# Patient Record
Sex: FEMALE | Race: WHITE | NOT HISPANIC OR LATINO | Employment: FULL TIME | URBAN - METROPOLITAN AREA
[De-identification: names, ages, dates, MRNs, and addresses within clinical notes are randomized per-mention and may not be internally consistent; named-entity substitution may affect disease eponyms.]

---

## 2017-07-10 ENCOUNTER — OFFICE VISIT (OUTPATIENT)
Dept: GASTROENTEROLOGY | Facility: CLINIC | Age: 52
End: 2017-07-10

## 2017-07-10 VITALS
WEIGHT: 124.2 LBS | TEMPERATURE: 98.3 F | SYSTOLIC BLOOD PRESSURE: 118 MMHG | DIASTOLIC BLOOD PRESSURE: 78 MMHG | HEIGHT: 64 IN | BODY MASS INDEX: 21.21 KG/M2

## 2017-07-10 DIAGNOSIS — K21.9 GASTROESOPHAGEAL REFLUX DISEASE WITHOUT ESOPHAGITIS: Primary | ICD-10-CM

## 2017-07-10 PROCEDURE — 99213 OFFICE O/P EST LOW 20 MIN: CPT | Performed by: NURSE PRACTITIONER

## 2017-07-10 RX ORDER — PANTOPRAZOLE SODIUM 40 MG/1
40 TABLET, DELAYED RELEASE ORAL DAILY
Qty: 90 TABLET | Refills: 4 | Status: SHIPPED | OUTPATIENT
Start: 2017-07-10 | End: 2018-08-10 | Stop reason: SDUPTHER

## 2017-07-10 NOTE — PROGRESS NOTES
"Chief Complaint   Patient presents with   • Heartburn         HPI    51-year-old female patient of Dr. Mike's followed in the office for history of GERD.  Last evaluated in June 2016.  At that time patient was complaining of breakthrough GERD symptoms that were occurring primarily at night.  She attempted to take her PPI twice daily but was unable to tolerate the medications as it \"made her feel bad\".  In addition, she did trial some over-the-counter medications which had similar side effects.  It was recommended that she take the PPI at night 30 minutes before her evening meal as her symptoms were worse in the evening as she was unable to tolerate twice a day dosing.  She presents today for routine follow-up    Patient states she is having much better symptom control with using the PPI at night.  Her GERD has been markedly improved.  Previously she was awaking anywhere from 5-7 times a night now it's anywhere from 2-3 times every couple of weeks.  She is having to utilize no over-the-counter medications for breakthrough symptoms.  She often finds that late night eating will precipitate one of the breakthrough events.  She has had no weight loss.  She denies any dysphagia or odynophagia.  Her prior EGD was performed in August 2010 with findings of gastritis with negative pathology.  Colonoscopy was performed at the same time with findings of only internal hemorrhoids.  She does report that she may have a family history of colon cancer affecting her grandmother but is unsure and plans to verify this with other family members.    Past Medical History:   Diagnosis Date   • GERD (gastroesophageal reflux disease)      Past Surgical History:   Procedure Laterality Date   • CHOLECYSTECTOMY     • COLONOSCOPY     • COLONOSCOPY W/ BIOPSIES  08/17/2010    ih, path benign   • HYSTERECTOMY     • UPPER GASTROINTESTINAL ENDOSCOPY  08/17/2010    acute gastritis, path mild chronic gastritis       Current Outpatient Prescriptions " "  Medication Sig Dispense Refill   • pantoprazole (PROTONIX) 40 MG EC tablet Take 1 tablet by mouth Daily. 90 tablet 4     No current facility-administered medications for this visit.        PRN Meds:.    Allergies   Allergen Reactions   • Penicillins    • Sulfa Antibiotics        Social History     Social History   • Marital status:      Spouse name: N/A   • Number of children: N/A   • Years of education: N/A     Occupational History   • Not on file.     Social History Main Topics   • Smoking status: Never Smoker   • Smokeless tobacco: Not on file   • Alcohol use No   • Drug use: Not on file   • Sexual activity: Not on file     Other Topics Concern   • Not on file     Social History Narrative       History reviewed. No pertinent family history.    Review of Systems   Constitutional: Negative for activity change, appetite change and unexpected weight change.   HENT: Negative for trouble swallowing.    Respiratory: Negative for choking and chest tightness.    Gastrointestinal: Negative for abdominal distention, abdominal pain, constipation, diarrhea, nausea and vomiting.   Allergic/Immunologic: Negative for immunocompromised state.       Vitals:    07/10/17 1003   BP: 118/78   Temp: 98.3 °F (36.8 °C)     /78  Temp 98.3 °F (36.8 °C)  Ht 64\" (162.6 cm)  Wt 124 lb 3.2 oz (56.3 kg)  BMI 21.32 kg/m2  Physical Exam   Constitutional: She is oriented to person, place, and time. She appears well-developed and well-nourished. No distress.   HENT:   Head: Normocephalic and atraumatic.   Eyes: No scleral icterus.   Cardiovascular: Normal rate and regular rhythm.    Pulmonary/Chest: Effort normal and breath sounds normal.   Abdominal: Soft. Bowel sounds are normal. She exhibits no distension. There is no tenderness.   Neurological: She is alert and oriented to person, place, and time.   Skin: Skin is warm and dry.   Psychiatric: She has a normal mood and affect.   Vitals reviewed.      ASSESSMENT AND PLAN    Leandra " was seen today for heartburn.    Diagnoses and all orders for this visit:    Gastroesophageal reflux disease without esophagitis  Comments:  EGD 8/2010 with gastritis, negative path.  Continue PPI    Other orders  -     pantoprazole (PROTONIX) 40 MG EC tablet; Take 1 tablet by mouth Daily.    Continue current PPI and refill was provided for one year.  Discussed avoidance of late night eating, common food triggers with regard to GERD symptoms and elevation of the head of the bed as preventative measures.  She will return to the office in one year, sooner for any concerns, questions or changes from today's assessment.        YAO Araujo   [facility]  [address] [place] [address]  Office: [phone]

## 2017-07-21 ENCOUNTER — DOCUMENTATION (OUTPATIENT)
Dept: GASTROENTEROLOGY | Facility: CLINIC | Age: 52
End: 2017-07-21

## 2017-07-31 ENCOUNTER — DOCUMENTATION (OUTPATIENT)
Dept: GASTROENTEROLOGY | Facility: CLINIC | Age: 52
End: 2017-07-31

## 2017-07-31 NOTE — PROGRESS NOTES
Spoke with Rob at [employer] he states that if patient is taking one table by mouth daily no PA is required.  Only required if patient is taking two a day

## 2018-08-10 ENCOUNTER — OFFICE VISIT (OUTPATIENT)
Dept: GASTROENTEROLOGY | Facility: CLINIC | Age: 53
End: 2018-08-10

## 2018-08-10 VITALS
DIASTOLIC BLOOD PRESSURE: 76 MMHG | BODY MASS INDEX: 20.73 KG/M2 | TEMPERATURE: 98.5 F | WEIGHT: 121.4 LBS | SYSTOLIC BLOOD PRESSURE: 116 MMHG | HEIGHT: 64 IN

## 2018-08-10 DIAGNOSIS — R10.10 PAIN OF UPPER ABDOMEN: ICD-10-CM

## 2018-08-10 DIAGNOSIS — K21.9 GASTROESOPHAGEAL REFLUX DISEASE WITHOUT ESOPHAGITIS: Primary | ICD-10-CM

## 2018-08-10 DIAGNOSIS — K29.30 CHRONIC SUPERFICIAL GASTRITIS WITHOUT BLEEDING: ICD-10-CM

## 2018-08-10 LAB
25(OH)D3+25(OH)D2 SERPL-MCNC: 41.2 NG/ML (ref 30–100)
ALBUMIN SERPL-MCNC: 4.5 G/DL (ref 3.5–5.2)
ALBUMIN/GLOB SERPL: 1.7 G/DL
ALP SERPL-CCNC: 96 U/L (ref 39–117)
ALT SERPL-CCNC: 20 U/L (ref 1–33)
AST SERPL-CCNC: 21 U/L (ref 1–32)
BASOPHILS # BLD MANUAL: 0 10*3/MM3 (ref 0–0.2)
BASOPHILS NFR BLD MANUAL: 0 % (ref 0–1.5)
BILIRUB SERPL-MCNC: 0.2 MG/DL (ref 0.1–1.2)
BUN SERPL-MCNC: 19 MG/DL (ref 6–20)
BUN/CREAT SERPL: 22.9 (ref 7–25)
CALCIUM SERPL-MCNC: 9.6 MG/DL (ref 8.6–10.5)
CHLORIDE SERPL-SCNC: 104 MMOL/L (ref 98–107)
CO2 SERPL-SCNC: 25.9 MMOL/L (ref 22–29)
CREAT SERPL-MCNC: 0.83 MG/DL (ref 0.57–1)
DIFFERENTIAL COMMENT: NORMAL
EOSINOPHIL # BLD MANUAL: 0.28 10*3/MM3 (ref 0–0.7)
EOSINOPHIL NFR BLD MANUAL: 4 % (ref 0.3–6.2)
ERYTHROCYTE [DISTWIDTH] IN BLOOD BY AUTOMATED COUNT: 13 % (ref 11.7–13)
GLOBULIN SER CALC-MCNC: 2.7 GM/DL
GLUCOSE SERPL-MCNC: 101 MG/DL (ref 65–99)
HCT VFR BLD AUTO: 42.3 % (ref 35.6–45.5)
HGB BLD-MCNC: 13.7 G/DL (ref 11.9–15.5)
LYMPHOCYTES # BLD MANUAL: 1.84 10*3/MM3 (ref 0.9–4.8)
LYMPHOCYTES NFR BLD MANUAL: 26 % (ref 19.6–45.3)
MCH RBC QN AUTO: 29.6 PG (ref 26.9–32)
MCHC RBC AUTO-ENTMCNC: 32.4 G/DL (ref 32.4–36.3)
MCV RBC AUTO: 91.4 FL (ref 80.5–98.2)
MONOCYTES # BLD MANUAL: 0.71 10*3/MM3 (ref 0.2–1.2)
MONOCYTES NFR BLD MANUAL: 10 % (ref 5–12)
NEUTROPHILS # BLD MANUAL: 4.24 10*3/MM3 (ref 1.9–8.1)
NEUTROPHILS NFR BLD MANUAL: 60 % (ref 42.7–76)
PLATELET # BLD AUTO: 240 10*3/MM3 (ref 140–500)
PLATELET BLD QL SMEAR: NORMAL
POTASSIUM SERPL-SCNC: 4.2 MMOL/L (ref 3.5–5.2)
PROT SERPL-MCNC: 7.2 G/DL (ref 6–8.5)
RBC # BLD AUTO: 4.63 10*6/MM3 (ref 3.9–5.2)
RBC MORPH BLD: NORMAL
SODIUM SERPL-SCNC: 143 MMOL/L (ref 136–145)
WBC # BLD AUTO: 7.07 10*3/MM3 (ref 4.5–10.7)

## 2018-08-10 PROCEDURE — 99214 OFFICE O/P EST MOD 30 MIN: CPT | Performed by: NURSE PRACTITIONER

## 2018-08-10 RX ORDER — CHOLECALCIFEROL (VITAMIN D3) 125 MCG
5 CAPSULE ORAL NIGHTLY PRN
COMMUNITY
End: 2020-12-08

## 2018-08-10 RX ORDER — PANTOPRAZOLE SODIUM 40 MG/1
40 TABLET, DELAYED RELEASE ORAL DAILY
Qty: 90 TABLET | Refills: 3 | Status: SHIPPED | OUTPATIENT
Start: 2018-08-10 | End: 2019-06-11 | Stop reason: ALTCHOICE

## 2018-08-10 NOTE — PROGRESS NOTES
Chief Complaint   Patient presents with   • Follow-up   • Heartburn       Leandra Corona is a  53 y.o. female here for a follow up visit for GERD.    HPI  52 yo f presents today for follow up visit for GERD. She is a patient of Dr. Mike. She was last seen in the office on 7/2017. She has hx GERD and admits she does well during the day on Protonix 40 mg daily but admits she has breakthrough reflux symptoms at night. She denies any dysphagia, abd pain, N&V, diarrhea, constipation, rectal bleeding or melena. She admits her appetite is good and her weight is stable.     Past Medical History:   Diagnosis Date   • GERD (gastroesophageal reflux disease)        Past Surgical History:   Procedure Laterality Date   • CHOLECYSTECTOMY     • COLONOSCOPY     • COLONOSCOPY W/ BIOPSIES  08/17/2010    ih, path benign   • HYSTERECTOMY     • UPPER GASTROINTESTINAL ENDOSCOPY  08/17/2010    acute gastritis, path mild chronic gastritis       Scheduled Meds:    Continuous Infusions:  No current facility-administered medications for this visit.     PRN Meds:.    Allergies   Allergen Reactions   • Penicillins    • Sulfa Antibiotics        Social History     Social History   • Marital status:      Spouse name: N/A   • Number of children: N/A   • Years of education: N/A     Occupational History   • Not on file.     Social History Main Topics   • Smoking status: Never Smoker   • Smokeless tobacco: Not on file   • Alcohol use No   • Drug use: Unknown   • Sexual activity: Not on file     Other Topics Concern   • Not on file     Social History Narrative   • No narrative on file       Family History   Problem Relation Age of Onset   • Family history unknown: Yes       Review of Systems   Constitutional: Negative for appetite change, chills, diaphoresis, fatigue, fever and unexpected weight change.   HENT: Negative for nosebleeds, postnasal drip, sore throat, trouble swallowing and voice change.    Respiratory: Negative for cough, choking,  chest tightness, shortness of breath and wheezing.    Cardiovascular: Negative for chest pain.   Gastrointestinal: Negative for abdominal distention, abdominal pain, anal bleeding, blood in stool, constipation, diarrhea, nausea, rectal pain and vomiting.   Endocrine: Negative for polydipsia, polyphagia and polyuria.   Musculoskeletal: Negative for gait problem.   Skin: Negative for rash and wound.   Allergic/Immunologic: Negative for food allergies.   Neurological: Negative for dizziness, speech difficulty and light-headedness.   Psychiatric/Behavioral: Negative for confusion, self-injury, sleep disturbance and suicidal ideas.       Vitals:    08/10/18 1509   BP: 116/76   Temp: 98.5 °F (36.9 °C)       Physical Exam   Constitutional: She is oriented to person, place, and time. She appears well-developed and well-nourished. She does not appear ill. No distress.   HENT:   Head: Normocephalic.   Eyes: Pupils are equal, round, and reactive to light.   Cardiovascular: Normal rate, regular rhythm and normal heart sounds.    Pulmonary/Chest: Effort normal and breath sounds normal.   Abdominal: Soft. Bowel sounds are normal. She exhibits no distension and no mass. There is no hepatosplenomegaly. There is no tenderness. There is no rebound and no guarding. No hernia.   Musculoskeletal: Normal range of motion.   Neurological: She is alert and oriented to person, place, and time.   Skin: Skin is warm and dry.   Psychiatric: She has a normal mood and affect. Her speech is normal and behavior is normal. Judgment normal.       No images are attached to the encounter.    Assessment & plan     1. Gastroesophageal reflux disease without esophagitis  - CBC & Differential  - Vitamin D 25 Hydroxy  - Comprehensive Metabolic Panel  - pantoprazole (PROTONIX) 40 MG EC tablet; Take 1 tablet by mouth Daily.  Dispense: 90 tablet; Refill: 3    2. Pain of upper abdomen  - CBC & Differential  - Vitamin D 25 Hydroxy  - Comprehensive Metabolic  Panel    3. Chronic superficial gastritis without bleeding      GERD is not well controlled at night. Will have her continue the protonix 40 mg daily but add a Zantac or pepcid onto her nightly routine. Discussed possible long term effects of PPIs. Will check labs to monitor Calcium and Vit D since patient does not have PCP right now. Will refill protonix x 1 year. Call office with any issues. Call office with update in 2-3 weeks. Follow up with Dr. Mike in 1 year.

## 2018-08-27 ENCOUNTER — TELEPHONE (OUTPATIENT)
Dept: GASTROENTEROLOGY | Facility: CLINIC | Age: 53
End: 2018-08-27

## 2018-08-27 NOTE — TELEPHONE ENCOUNTER
----- Message from YAO Hardy sent at 8/13/2018  8:28 AM EDT -----  Please call patient and let her know all the labs were good. Thanks.

## 2019-01-28 ENCOUNTER — TELEPHONE (OUTPATIENT)
Dept: GASTROENTEROLOGY | Facility: CLINIC | Age: 54
End: 2019-01-28

## 2019-01-28 NOTE — TELEPHONE ENCOUNTER
Patient called, advised as per Sanaz KRISHNAMURTHY's note. She verb understanding and is agreeable to the plan. States she will call back on Friday with a health status update.

## 2019-01-28 NOTE — TELEPHONE ENCOUNTER
----- Message from Robson Junior sent at 1/28/2019 11:20 AM EST -----  Regarding: Abdominal pain  Contact: [phone]  Having some pain and would like to discuss.

## 2019-01-28 NOTE — TELEPHONE ENCOUNTER
Would recommend taking the protonix in the AM and adding a Zantac or pepcid in the evening. Call back with update Friday. If pain worsens recommend patient go to ER for immediate eval.

## 2019-01-28 NOTE — TELEPHONE ENCOUNTER
Per last office note 8/10/18, pt was to continue Protonix daily but also add in Zantac or Pepcid nightly for GERD.     Called pt back.... No answer; left a message for call back.

## 2019-01-28 NOTE — TELEPHONE ENCOUNTER
"Returned patient's phone call. She states has right upper quadrant pain and on the left upper quadrant she states she has 'puffiness\" present. She states the right sided pain is persistent dull pain that is progressively becoming more sharp in nature. She states she does have nausea on occasion. She states her symptoms have been present since last week. States she is still taking Pantoprazole at 4pm every day and has not taken any taken Zantac or Pepcid. States she does take an occasional TUMS. Advised will send an update to the NP for advisement. She verb understanding.   "

## 2019-03-08 ENCOUNTER — TELEPHONE (OUTPATIENT)
Dept: GASTROENTEROLOGY | Facility: CLINIC | Age: 54
End: 2019-03-08

## 2019-03-08 NOTE — TELEPHONE ENCOUNTER
Called [employer] line, [phone], spoke with Sha. She states the patient's Pantoprazole requires PA.  PA approved from 3/8/19-3/7/20  PA authorization number: [id number].  Call placed to [facility], spoke with Mariposa Garcia.   She states she will contact the patient.

## 2019-03-08 NOTE — TELEPHONE ENCOUNTER
Patient states she has changed insurances and her new insurance requires a PA for her Pantoprazole.

## 2019-03-08 NOTE — TELEPHONE ENCOUNTER
----- Message from Marcy Blue sent at 3/8/2019 10:36 AM EST -----  Regarding: pt called   Contact: [phone]  Pt is asking if another letter can get sent to her insurance stating the medication  prescribed to her that she deff needs it. Says we have done it for her in the past .

## 2019-03-12 ENCOUNTER — PRIOR AUTHORIZATION (OUTPATIENT)
Dept: GASTROENTEROLOGY | Facility: CLINIC | Age: 54
End: 2019-03-12

## 2019-04-18 ENCOUNTER — OFFICE VISIT (OUTPATIENT)
Dept: GASTROENTEROLOGY | Facility: CLINIC | Age: 54
End: 2019-04-18

## 2019-04-18 VITALS
TEMPERATURE: 98.4 F | WEIGHT: 125.6 LBS | HEIGHT: 64 IN | BODY MASS INDEX: 21.44 KG/M2 | SYSTOLIC BLOOD PRESSURE: 120 MMHG | DIASTOLIC BLOOD PRESSURE: 80 MMHG

## 2019-04-18 DIAGNOSIS — K21.9 GASTROESOPHAGEAL REFLUX DISEASE, ESOPHAGITIS PRESENCE NOT SPECIFIED: ICD-10-CM

## 2019-04-18 DIAGNOSIS — R19.02 LEFT UPPER QUADRANT ABDOMINAL SWELLING: ICD-10-CM

## 2019-04-18 DIAGNOSIS — R10.10 PAIN OF UPPER ABDOMEN: Primary | ICD-10-CM

## 2019-04-18 DIAGNOSIS — K62.5 RECTAL BLEEDING: ICD-10-CM

## 2019-04-18 PROCEDURE — 99214 OFFICE O/P EST MOD 30 MIN: CPT | Performed by: NURSE PRACTITIONER

## 2019-04-18 RX ORDER — DIPHENHYDRAMINE HCL 25 MG
25 CAPSULE ORAL NIGHTLY
COMMUNITY
End: 2019-10-15

## 2019-04-18 NOTE — PROGRESS NOTES
Chief Complaint   Patient presents with   • Abdominal Pain   • Bloated   • Rectal Bleeding       Leandra Corona is a  53 y.o. female here for abd pain and rectal bleeding.     HPI   52 yo established f presents today for rectal bleeding, upper abd pain and swelling. She is a patient of Dr. Mike. She was last seen in the office on 8/2018. She has hx GERD/chronic gastritis and had been doing well with Protonix 40 mg daily but admits for the past several months she has been having worsening reflux especially at night. She tried adding Zantac and Pepcid at night but it didn't really help much. She admits for the past several weeks she has been having some RUQ abd pain and LUQ abd swelling. She tells me if looks like a pocket of swelling on her left upper abdomen right below her ribs. She admits its not tender to the touch. She denies any changes with her bowels but admits she has had 3 episodes of rectal bleeding this last week which is new for her. She denies any dysphagia, N&V, diarrhea, constipation or melena. She admits her appetite is ok and her weight is stable. Her last EGD/Colonoscopy was done in 8/2010. She does have hx cholecystectomy.       Past Medical History:   Diagnosis Date   • GERD (gastroesophageal reflux disease)        Past Surgical History:   Procedure Laterality Date   • CHOLECYSTECTOMY     • COLONOSCOPY     • COLONOSCOPY W/ BIOPSIES  08/17/2010    ih, path benign   • HYSTERECTOMY     • UPPER GASTROINTESTINAL ENDOSCOPY  08/17/2010    acute gastritis, path mild chronic gastritis       Scheduled Meds:    Continuous Infusions:  No current facility-administered medications for this visit.     PRN Meds:.    Allergies   Allergen Reactions   • Penicillins Rash   • Sulfa Antibiotics Rash       Social History     Socioeconomic History   • Marital status:      Spouse name: Not on file   • Number of children: Not on file   • Years of education: Not on file   • Highest education level: Not on file    Tobacco Use   • Smoking status: Never Smoker   • Smokeless tobacco: Never Used   Substance and Sexual Activity   • Alcohol use: No   • Drug use: No       Family History   Problem Relation Age of Onset   • Stomach cancer Paternal Uncle    • Stomach cancer Paternal Grandmother        Review of Systems   Constitutional: Negative for appetite change, fatigue, fever and unexpected weight change.   HENT: Negative for trouble swallowing.    Respiratory: Negative for cough, choking, chest tightness and shortness of breath.    Cardiovascular: Negative for chest pain, palpitations and leg swelling.   Gastrointestinal: Positive for abdominal distention, abdominal pain and anal bleeding. Negative for blood in stool, constipation, diarrhea, nausea, rectal pain and vomiting.       Vitals:    04/18/19 1451   BP: 120/80   Temp: 98.4 °F (36.9 °C)       Physical Exam   Abdominal: Soft. Bowel sounds are normal. She exhibits distension. There is tenderness.       Arrow indicates area of soft, swelling. No redness or warmth noted.     Square is tender on exam.        No images are attached to the encounter.    Assessment & Plan     1. Pain of upper abdomen  - Case Request; Standing    2. Left upper quadrant abdominal swelling  - Case Request; Standing    3. Gastroesophageal reflux disease, esophagitis presence not specified  - Case Request; Standing    4. Rectal bleeding  - Case Request; Standing    Given her hx and current symptoms recommend EGD and colonoscopy with Dr. Mike for further evaluation. Patient is agreeable with the plan. Continue the Protonix for now. Call office with any issues.

## 2019-05-01 ENCOUNTER — TELEPHONE (OUTPATIENT)
Dept: GASTROENTEROLOGY | Facility: CLINIC | Age: 54
End: 2019-05-01

## 2019-05-01 ENCOUNTER — OUTSIDE FACILITY SERVICE (OUTPATIENT)
Dept: GASTROENTEROLOGY | Facility: CLINIC | Age: 54
End: 2019-05-01

## 2019-05-01 DIAGNOSIS — R10.9 ABDOMINAL PAIN, UNSPECIFIED ABDOMINAL LOCATION: Primary | ICD-10-CM

## 2019-05-01 PROCEDURE — 43239 EGD BIOPSY SINGLE/MULTIPLE: CPT | Performed by: INTERNAL MEDICINE

## 2019-05-01 PROCEDURE — 45378 DIAGNOSTIC COLONOSCOPY: CPT | Performed by: INTERNAL MEDICINE

## 2019-05-01 NOTE — TELEPHONE ENCOUNTER
----- Message from Tristen Mike MD sent at 5/1/2019  3:19 PM EDT -----  Regarding: test and rx  Please call in levsin .125mg sl one to two tabs q4 hrs prn pain #60  5 rf    Schedule cat scan abd/pelvis with contrast for abd pain

## 2019-05-07 ENCOUNTER — TELEPHONE (OUTPATIENT)
Dept: GASTROENTEROLOGY | Facility: CLINIC | Age: 54
End: 2019-05-07

## 2019-05-07 NOTE — PROGRESS NOTES
Pathology is negative  Colonoscopy recall 10 years  Await CAT scan of the abdomen already ordered  Follow-up nurse practitioner 4 weeks

## 2019-05-07 NOTE — TELEPHONE ENCOUNTER
Called pt and advised of the note from Dr Mike. She verb understanding. CT is scheduled for May 13th. Follow-up appt sched with BG for 6/3 at 3:15PM.     Health Maintenance updated to reflect C/S due 5/2029.

## 2019-05-07 NOTE — TELEPHONE ENCOUNTER
----- Message from Tristen Mike MD sent at 5/7/2019  1:31 PM EDT -----  Pathology is negative  Colonoscopy recall 10 years  Await CAT scan of the abdomen already ordered  Follow-up nurse practitioner 4 weeks

## 2019-05-13 ENCOUNTER — HOSPITAL ENCOUNTER (OUTPATIENT)
Dept: CT IMAGING | Facility: HOSPITAL | Age: 54
Discharge: HOME | End: 2019-05-13
Admitting: INTERNAL MEDICINE

## 2019-05-13 DIAGNOSIS — R10.9 ABDOMINAL PAIN, UNSPECIFIED ABDOMINAL LOCATION: ICD-10-CM

## 2019-05-13 LAB — CREAT BLDA-MCNC: 0.8 MG/DL (ref 0.6–1.3)

## 2019-05-13 PROCEDURE — 74177 CT ABD & PELVIS W/CONTRAST: CPT

## 2019-05-13 PROCEDURE — 25010000002 IOPAMIDOL 61 % SOLUTION: Performed by: INTERNAL MEDICINE

## 2019-05-13 PROCEDURE — 82565 ASSAY OF CREATININE: CPT

## 2019-05-13 RX ADMIN — IOPAMIDOL 85 ML: 612 INJECTION, SOLUTION INTRAVENOUS at 15:44

## 2019-05-28 ENCOUNTER — TELEPHONE (OUTPATIENT)
Dept: GASTROENTEROLOGY | Facility: CLINIC | Age: 54
End: 2019-05-28

## 2019-05-28 NOTE — TELEPHONE ENCOUNTER
----- Message from Tristen Mike MD sent at 5/21/2019  6:22 AM EDT -----  Cat scan normal  Follow up with BG already scheduled to discuss further testing and treatment

## 2019-05-28 NOTE — TELEPHONE ENCOUNTER
Called pt and advised that per Dr Mike: her CT scan was normal. Advised that MD recommends to f/u with Ana María as scheduled, Monday 6/3 at 3:15PM. Pt verb understanding.

## 2019-06-03 ENCOUNTER — OFFICE VISIT (OUTPATIENT)
Dept: GASTROENTEROLOGY | Facility: CLINIC | Age: 54
End: 2019-06-03

## 2019-06-03 VITALS
DIASTOLIC BLOOD PRESSURE: 80 MMHG | BODY MASS INDEX: 21.51 KG/M2 | SYSTOLIC BLOOD PRESSURE: 112 MMHG | HEIGHT: 64 IN | TEMPERATURE: 98.7 F | WEIGHT: 126 LBS

## 2019-06-03 DIAGNOSIS — R10.10 PAIN OF UPPER ABDOMEN: Primary | ICD-10-CM

## 2019-06-03 DIAGNOSIS — K21.9 GASTROESOPHAGEAL REFLUX DISEASE, ESOPHAGITIS PRESENCE NOT SPECIFIED: ICD-10-CM

## 2019-06-03 PROCEDURE — 99214 OFFICE O/P EST MOD 30 MIN: CPT | Performed by: NURSE PRACTITIONER

## 2019-06-03 NOTE — PROGRESS NOTES
Chief Complaint   Patient presents with   • Abdominal Pain     HPI    Leandra Corona is a  53 y.o. female here for a follow up visit for abdominal pain.  This is an established patient of Dr. Mike's, new to me.  Patient was last seen in April for complaints of abdominal pain and rectal bleeding.      EGD and C/s 4/2019-- Normal EGD; nonbleeding internal hemorrhoids otherwise normal examination.    CT A/P 5/19--status post cholecystectomy and hysterectomy, no acute process identified.    On visit today patient still complains of pain in the upper abdomen mainly right upper quadrant midclavicular line.  Pain occurs daily and improves with antispasmodic.  Pain is described as an ache that comes and goes.  Possibly triggered by carbonated drinks or coffee.  No nausea, vomiting, poor appetite.  Patient denies dysphagia.    Bowels are moving daily with soft stools.  No diarrhea, constipation, rectal bleeding.    No recent labs.    Patient needs to establish care with a new primary care provider as her prior physician retired.    Status post cholecystectomy from 2010, patient reports gallstones.    Past Medical History:   Diagnosis Date   • GERD (gastroesophageal reflux disease)        Past Surgical History:   Procedure Laterality Date   • CHOLECYSTECTOMY     • COLONOSCOPY  05/01/2019    IH, otherwise normal   • COLONOSCOPY     • COLONOSCOPY W/ BIOPSIES  08/17/2010    ih, path benign   • ENDOSCOPY  05/01/2019    normal   • HYSTERECTOMY     • UPPER GASTROINTESTINAL ENDOSCOPY  08/17/2010    acute gastritis, path mild chronic gastritis       Scheduled Meds:  Outpatient Encounter Medications as of 6/3/2019   Medication Sig Dispense Refill   • diphenhydrAMINE (BENADRYL) 25 mg capsule Take 25 mg by mouth Every Night.     • hyoscyamine (LEVSIN) 0.125 MG SL tablet One to two tablets every 4 hours as needed for pain/cramping. 60 tablet 5   • melatonin 5 MG tablet tablet Take 5 mg by mouth At Night As Needed.     • pantoprazole  (PROTONIX) 40 MG EC tablet Take 1 tablet by mouth Daily. 90 tablet 3     No facility-administered encounter medications on file as of 6/3/2019.        Continuous Infusions:  No current facility-administered medications for this visit.     PRN Meds:.    Allergies   Allergen Reactions   • Penicillins Rash   • Sulfa Antibiotics Rash       Social History     Socioeconomic History   • Marital status:      Spouse name: Not on file   • Number of children: Not on file   • Years of education: Not on file   • Highest education level: Not on file   Tobacco Use   • Smoking status: Never Smoker   • Smokeless tobacco: Never Used   Substance and Sexual Activity   • Alcohol use: No   • Drug use: No       Family History   Problem Relation Age of Onset   • Stomach cancer Paternal Uncle    • Stomach cancer Paternal Grandmother        Review of Systems   Constitutional: Negative for activity change, appetite change, fatigue, fever and unexpected weight change.   HENT: Negative for trouble swallowing.    Respiratory: Negative for apnea, cough, choking, chest tightness, shortness of breath and wheezing.    Cardiovascular: Negative for chest pain, palpitations and leg swelling.   Gastrointestinal: Positive for abdominal pain. Negative for abdominal distention, anal bleeding, blood in stool, constipation, diarrhea, nausea, rectal pain and vomiting.       Vitals:    06/03/19 1527   BP: 112/80   Temp: 98.7 °F (37.1 °C)       Physical Exam   Constitutional: She is oriented to person, place, and time. She appears well-developed and well-nourished.   Eyes: Pupils are equal, round, and reactive to light.   Cardiovascular: Normal rate, regular rhythm and normal heart sounds.   Pulmonary/Chest: Effort normal and breath sounds normal. No respiratory distress. She has no wheezes.   Abdominal: Soft. Bowel sounds are normal. She exhibits no distension and no mass. There is no tenderness. There is no guarding. No hernia.   Musculoskeletal:  Normal range of motion.   Neurological: She is alert and oriented to person, place, and time.   Skin: Skin is warm and dry. Capillary refill takes less than 2 seconds.   Psychiatric: She has a normal mood and affect. Her behavior is normal.       No images are attached to the encounter.    Leandra was seen today for abdominal pain.    Diagnoses and all orders for this visit:    Pain of upper abdomen  -     CBC & Differential  -     Comprehensive Metabolic Panel  -     Lipase    Gastroesophageal reflux disease, esophagitis presence not specified      Assessment    #1 pain in the upper abdomen, unclear etiology at this point.  Recent endoscopic evaluation and CT were normal.  Negative Carnett's sign.    #2 GERD, has been on current PPI for several years.    Plan    CBC, CMP, and lipase today.  Trial of alternative PPI therapy with Dexilant, samples provided.  Avoid NSAIDs.  Consider desipramine in the future.  Return to clinic 6 weeks.

## 2019-06-04 ENCOUNTER — TELEPHONE (OUTPATIENT)
Dept: GASTROENTEROLOGY | Facility: CLINIC | Age: 54
End: 2019-06-04

## 2019-06-04 LAB
ALBUMIN SERPL-MCNC: 4.6 G/DL (ref 3.5–5.2)
ALBUMIN/GLOB SERPL: 2.1 G/DL
ALP SERPL-CCNC: 94 U/L (ref 39–117)
ALT SERPL-CCNC: 15 U/L (ref 1–33)
AST SERPL-CCNC: 18 U/L (ref 1–32)
BASOPHILS # BLD AUTO: 0.06 10*3/MM3 (ref 0–0.2)
BASOPHILS NFR BLD AUTO: 0.9 % (ref 0–1.5)
BILIRUB SERPL-MCNC: 0.2 MG/DL (ref 0.2–1.2)
BUN SERPL-MCNC: 16 MG/DL (ref 6–20)
BUN/CREAT SERPL: 21.6 (ref 7–25)
CALCIUM SERPL-MCNC: 10 MG/DL (ref 8.6–10.5)
CHLORIDE SERPL-SCNC: 104 MMOL/L (ref 98–107)
CO2 SERPL-SCNC: 28.8 MMOL/L (ref 22–29)
CREAT SERPL-MCNC: 0.74 MG/DL (ref 0.57–1)
EOSINOPHIL # BLD AUTO: 0.08 10*3/MM3 (ref 0–0.4)
EOSINOPHIL NFR BLD AUTO: 1.2 % (ref 0.3–6.2)
ERYTHROCYTE [DISTWIDTH] IN BLOOD BY AUTOMATED COUNT: 12.6 % (ref 12.3–15.4)
GLOBULIN SER CALC-MCNC: 2.2 GM/DL
GLUCOSE SERPL-MCNC: 95 MG/DL (ref 65–99)
HCT VFR BLD AUTO: 40.3 % (ref 34–46.6)
HGB BLD-MCNC: 12.6 G/DL (ref 12–15.9)
IMM GRANULOCYTES # BLD AUTO: 0.02 10*3/MM3 (ref 0–0.05)
IMM GRANULOCYTES NFR BLD AUTO: 0.3 % (ref 0–0.5)
LIPASE SERPL-CCNC: 57 U/L (ref 13–60)
LYMPHOCYTES # BLD AUTO: 2.32 10*3/MM3 (ref 0.7–3.1)
LYMPHOCYTES NFR BLD AUTO: 34.1 % (ref 19.6–45.3)
MCH RBC QN AUTO: 29.2 PG (ref 26.6–33)
MCHC RBC AUTO-ENTMCNC: 31.3 G/DL (ref 31.5–35.7)
MCV RBC AUTO: 93.5 FL (ref 79–97)
MONOCYTES # BLD AUTO: 0.65 10*3/MM3 (ref 0.1–0.9)
MONOCYTES NFR BLD AUTO: 9.6 % (ref 5–12)
NEUTROPHILS # BLD AUTO: 3.67 10*3/MM3 (ref 1.7–7)
NEUTROPHILS NFR BLD AUTO: 53.9 % (ref 42.7–76)
NRBC BLD AUTO-RTO: 0 /100 WBC (ref 0–0.2)
PLATELET # BLD AUTO: 220 10*3/MM3 (ref 140–450)
POTASSIUM SERPL-SCNC: 4.1 MMOL/L (ref 3.5–5.2)
PROT SERPL-MCNC: 6.8 G/DL (ref 6–8.5)
RBC # BLD AUTO: 4.31 10*6/MM3 (ref 3.77–5.28)
SODIUM SERPL-SCNC: 142 MMOL/L (ref 136–145)
WBC # BLD AUTO: 6.8 10*3/MM3 (ref 3.4–10.8)

## 2019-06-04 NOTE — TELEPHONE ENCOUNTER
----- Message from YAO Marrero sent at 6/4/2019  8:38 AM EDT -----  Please notify patient that labs are entirely normal.

## 2019-06-11 ENCOUNTER — TELEPHONE (OUTPATIENT)
Dept: GASTROENTEROLOGY | Facility: CLINIC | Age: 54
End: 2019-06-11

## 2019-06-11 RX ORDER — DEXLANSOPRAZOLE 60 MG/1
60 CAPSULE, DELAYED RELEASE ORAL DAILY
Qty: 30 CAPSULE | Refills: 1 | Status: SHIPPED | OUTPATIENT
Start: 2019-06-11 | End: 2019-08-14 | Stop reason: SDUPTHER

## 2019-06-11 NOTE — TELEPHONE ENCOUNTER
----- Message from Gabrielle Valadez sent at 6/11/2019  3:27 PM EDT -----  Regarding: Call back/meds  Contact: [phone]  Pt is asking Ana María to call her back regarding dexlansoprazole (DEXILANT) 60 MG

## 2019-06-11 NOTE — TELEPHONE ENCOUNTER
----- Message from Marcy Blue sent at 6/11/2019  8:29 AM EDT -----  Regarding: medication   Contact: [phone]  Pt is calling asking for a refill on her dexilant, pt says she received samples       Pharm#[phone]

## 2019-06-11 NOTE — TELEPHONE ENCOUNTER
Patient states she is out of Dexilant and her insurance is requiring a PA for it.  Requested another sample box of Dexilant. One box left at the  for patient pickup.

## 2019-06-11 NOTE — TELEPHONE ENCOUNTER
See note of 6/3.  Escribe completed for dexilant 60 mg 1 tab po daily, #30, R1 (appt 7/15).  Call to pt to advise of above.  Verb understanding.    **please note - Dr Mike pt**.

## 2019-06-17 ENCOUNTER — TELEPHONE (OUTPATIENT)
Dept: GASTROENTEROLOGY | Facility: CLINIC | Age: 54
End: 2019-06-17

## 2019-06-17 ENCOUNTER — PRIOR AUTHORIZATION (OUTPATIENT)
Dept: GASTROENTEROLOGY | Facility: CLINIC | Age: 54
End: 2019-06-17

## 2019-06-17 NOTE — TELEPHONE ENCOUNTER
Patient called, advised her Dexilant PA has gone through as an approval and can  her medication at her pharmacy. She verb understanding.

## 2019-06-17 NOTE — TELEPHONE ENCOUNTER
Returned patient's phone call. She states she is waiting on approval for her Dexilant. Advised we had not received any paperwork from her pharmacy. Once received will assign to someone to work on her PA. She verb understanding.

## 2019-06-17 NOTE — TELEPHONE ENCOUNTER
----- Message from Rashad Taylor sent at 6/17/2019 11:09 AM EDT -----  Regarding: dexilant   Contact: [phone]  Questions about med

## 2019-06-17 NOTE — TELEPHONE ENCOUNTER
PA for Dexilant 60MG initated through [facility]. This request has received a Favorable outcome. Key: [id number]

## 2019-07-15 ENCOUNTER — OFFICE VISIT (OUTPATIENT)
Dept: GASTROENTEROLOGY | Facility: CLINIC | Age: 54
End: 2019-07-15

## 2019-07-15 VITALS
TEMPERATURE: 98 F | SYSTOLIC BLOOD PRESSURE: 118 MMHG | WEIGHT: 127 LBS | HEIGHT: 64 IN | DIASTOLIC BLOOD PRESSURE: 74 MMHG | BODY MASS INDEX: 21.68 KG/M2

## 2019-07-15 DIAGNOSIS — K21.9 GASTROESOPHAGEAL REFLUX DISEASE, ESOPHAGITIS PRESENCE NOT SPECIFIED: Primary | ICD-10-CM

## 2019-07-15 PROCEDURE — 99213 OFFICE O/P EST LOW 20 MIN: CPT | Performed by: NURSE PRACTITIONER

## 2019-07-15 RX ORDER — DEXLANSOPRAZOLE 60 MG/1
60 CAPSULE, DELAYED RELEASE ORAL DAILY
COMMUNITY
End: 2019-10-15

## 2019-07-15 NOTE — PROGRESS NOTES
Chief Complaint   Patient presents with   • Abdominal Pain     HPI    Leandra Corona is a  54 y.o. female here for a follow up visit for abdominal pain.  This is an established patient Dr. Willoughby.  She was last seen in the office in June for complaints of abdominal pain.  On last office visit patient was started on trial of Dexilant.  She was instructed to avoid NSAIDs.  I reviewed labs 6/3/19 w/ Lipase, CBC and CMP were entirely normal.    On visit today patient reports resolution of abdominal pain since starting Dexilant.  She is taking 60 mg once daily.  No nausea, vomiting, heartburn, acid reflux, or dysphagia.  Appetite is good.  Weight is stable.  She continues to avoid NSAIDs.    Bowels are moving daily with soft stools.  No diarrhea, constipation, rectal bleeding.    Reviewed prior colonoscopy and EGD performed in April 2019 with normal upper endoscopy, nonbleeding internal hemorrhoids otherwise normal examination of the colon.    Revieweded CT of abdomen pelvis from May 2019--status post cholecystectomy hysterectomy, no acute process identified.      Past Medical History:   Diagnosis Date   • GERD (gastroesophageal reflux disease)        Past Surgical History:   Procedure Laterality Date   • CHOLECYSTECTOMY     • COLONOSCOPY  05/01/2019    IH, otherwise normal   • COLONOSCOPY     • COLONOSCOPY  05/01/2019    [facility], normal ileum, no specimens collected   • COLONOSCOPY W/ BIOPSIES  08/17/2010    ih, path benign   • ENDOSCOPY  05/01/2019    normal   • ENDOSCOPY  05/01/2019    normal exam, path: mild reactive gastropathy   • HYSTERECTOMY     • UPPER GASTROINTESTINAL ENDOSCOPY  08/17/2010    acute gastritis, path mild chronic gastritis       Scheduled Meds:  Outpatient Encounter Medications as of 7/15/2019   Medication Sig Dispense Refill   • dexlansoprazole (DEXILANT) 60 MG capsule Take 60 mg by mouth Daily.     • diphenhydrAMINE (BENADRYL) 25 mg capsule Take 25 mg by mouth Every Night.     • melatonin 5 MG  tablet tablet Take 5 mg by mouth At Night As Needed.     • hyoscyamine (LEVSIN) 0.125 MG SL tablet One to two tablets every 4 hours as needed for pain/cramping. 60 tablet 5     No facility-administered encounter medications on file as of 7/15/2019.        Continuous Infusions:  No current facility-administered medications for this visit.     PRN Meds:.    Allergies   Allergen Reactions   • Penicillins Rash   • Sulfa Antibiotics Rash       Social History     Socioeconomic History   • Marital status:      Spouse name: Not on file   • Number of children: Not on file   • Years of education: Not on file   • Highest education level: Not on file   Tobacco Use   • Smoking status: Never Smoker   • Smokeless tobacco: Never Used   Substance and Sexual Activity   • Alcohol use: No   • Drug use: No       Family History   Problem Relation Age of Onset   • Stomach cancer Paternal Uncle    • Stomach cancer Paternal Grandmother        Review of Systems   Constitutional: Negative for activity change, appetite change, fatigue, fever and unexpected weight change.   HENT: Negative for trouble swallowing.    Respiratory: Negative for apnea, cough, choking, chest tightness, shortness of breath and wheezing.    Cardiovascular: Negative for chest pain, palpitations and leg swelling.   Gastrointestinal: Negative for abdominal distention, abdominal pain, anal bleeding, blood in stool, constipation, diarrhea, nausea, rectal pain and vomiting.       Vitals:    07/15/19 1340   BP: 118/74   Temp: 98 °F (36.7 °C)       Physical Exam   Constitutional: She is oriented to person, place, and time. She appears well-developed and well-nourished.   Eyes: Pupils are equal, round, and reactive to light.   Cardiovascular: Normal rate, regular rhythm and normal heart sounds.   Pulmonary/Chest: Effort normal and breath sounds normal. No respiratory distress. She has no wheezes.   Abdominal: Soft. Bowel sounds are normal. She exhibits no distension and  no mass. There is no tenderness. There is no guarding. No hernia.   Musculoskeletal: Normal range of motion.   Neurological: She is alert and oriented to person, place, and time.   Skin: Skin is warm and dry. Capillary refill takes less than 2 seconds.   Psychiatric: She has a normal mood and affect. Her behavior is normal.       No images are attached to the encounter.    Leandra was seen today for abdominal pain.    Diagnoses and all orders for this visit:    Gastroesophageal reflux disease, esophagitis presence not specified      Impression:    This is a pleasant 54-year-old female seen today in follow-up for abdominal pain.  Patient reports resolution of abdominal pain after starting Dexilant 60 mg once daily.  She is avoiding NSAIDs.  We had a very long discussion regarding GERD diet and lifestyle modifications.  I provided her with an educational handout.  We discussed foods to avoid that can relax the lower esophageal sphincter.  I also reviewed prior endoscopic evaluation with the patient as well as pathology.  At this point continue current PPI.  Return to clinic 3 months.  If patient remains asymptomatic at next office visit consider decreasing Dexilant to 30 mg once daily.  Patient is agreeable to plan of care.

## 2019-08-14 RX ORDER — DEXLANSOPRAZOLE 60 MG/1
CAPSULE, DELAYED RELEASE ORAL
Qty: 30 CAPSULE | Refills: 1 | Status: SHIPPED | OUTPATIENT
Start: 2019-08-14 | End: 2019-10-15

## 2019-08-14 NOTE — TELEPHONE ENCOUNTER
----- Message from Ila Calderon sent at 8/14/2019  4:06 PM EDT -----  Regarding: Med Refill  Contact: [phone]  Dexilant 60 mg refill to [facility] in [facility]

## 2019-09-26 ENCOUNTER — TELEPHONE (OUTPATIENT)
Dept: GASTROENTEROLOGY | Facility: CLINIC | Age: 54
End: 2019-09-26

## 2019-09-26 RX ORDER — HYOSCYAMINE SULFATE 0.125 MG
TABLET ORAL
Qty: 360 TABLET | Refills: 2 | Status: SHIPPED | OUTPATIENT
Start: 2019-09-26 | End: 2019-10-15

## 2019-09-26 RX ORDER — DEXLANSOPRAZOLE 60 MG/1
60 CAPSULE, DELAYED RELEASE ORAL
Qty: 90 CAPSULE | Refills: 2 | Status: SHIPPED | OUTPATIENT
Start: 2019-09-26 | End: 2020-10-12 | Stop reason: SDUPTHER

## 2019-09-26 NOTE — TELEPHONE ENCOUNTER
----- Message from Ila Calderon sent at 9/26/2019  3:54 PM EDT -----  Regarding: REFILL  Contact: [phone]  DEXILANT AND HYOSCYAMINE REFILLS REQUESTED BY PATIENT  [facility]  [address]

## 2019-10-15 ENCOUNTER — OFFICE VISIT (OUTPATIENT)
Dept: GASTROENTEROLOGY | Facility: CLINIC | Age: 54
End: 2019-10-15

## 2019-10-15 VITALS
DIASTOLIC BLOOD PRESSURE: 70 MMHG | WEIGHT: 126.4 LBS | TEMPERATURE: 97.9 F | BODY MASS INDEX: 21.58 KG/M2 | HEIGHT: 64 IN | SYSTOLIC BLOOD PRESSURE: 116 MMHG

## 2019-10-15 DIAGNOSIS — K21.9 GASTROESOPHAGEAL REFLUX DISEASE, ESOPHAGITIS PRESENCE NOT SPECIFIED: Primary | ICD-10-CM

## 2019-10-15 PROCEDURE — 99213 OFFICE O/P EST LOW 20 MIN: CPT | Performed by: NURSE PRACTITIONER

## 2019-10-15 NOTE — PROGRESS NOTES
Chief Complaint   Patient presents with   • Heartburn     HPI    Leandra Corona is a  54 y.o. female here for a follow up visit for heartburn.  This is an established patient known to me.  She was started on Dexilant over the summer for complaints of abdominal pain.    On visit today she continues to do well with Dexilant 60 mg once daily.  She avoids known dietary triggers.  She is avoiding NSAIDs.  No nausea, vomiting, abdominal pain, or dysphagia.  Her appetite is good.  Her weight is stable.    Bowels are moving daily with soft stools.  No diarrhea, constipation, or rectal bleeding.    Reviewed prior colonoscopy and EGD performed in April 2019 with normal upper endoscopy, nonbleeding internal hemorrhoids otherwise normal examination of the colon.     Revieweded CT of abdomen pelvis from May 2019--status post cholecystectomy hysterectomy, no acute process identified.     Past Medical History:   Diagnosis Date   • GERD (gastroesophageal reflux disease)        Past Surgical History:   Procedure Laterality Date   • CHOLECYSTECTOMY     • COLONOSCOPY  05/01/2019    IH, otherwise normal   • COLONOSCOPY     • COLONOSCOPY  05/01/2019    [facility], normal ileum, no specimens collected   • COLONOSCOPY W/ BIOPSIES  08/17/2010    ih, path benign   • ENDOSCOPY  05/01/2019    normal   • ENDOSCOPY  05/01/2019    normal exam, path: mild reactive gastropathy   • HYSTERECTOMY     • UPPER GASTROINTESTINAL ENDOSCOPY  08/17/2010    acute gastritis, path mild chronic gastritis       Scheduled Meds:  Outpatient Encounter Medications as of 10/15/2019   Medication Sig Dispense Refill   • dexlansoprazole (DEXILANT) 60 MG capsule Take 1 capsule by mouth Every Morning Before Breakfast. 90 capsule 2   • doxylamine (UNISOM) 25 MG tablet Take 25 mg by mouth At Night As Needed for Sleep (1/2 tab qhs).     • hyoscyamine (LEVSIN) 0.125 MG SL tablet One to two tablets every 4 hours as needed for pain/cramping. 60 tablet 5   • melatonin 5 MG tablet  tablet Take 5 mg by mouth At Night As Needed.     • [DISCONTINUED] DEXILANT 60 MG capsule TAKE 1 CAPSULE BY MOUTH ONCE DAILY FOR 30 DAYS 30 capsule 1   • [DISCONTINUED] dexlansoprazole (DEXILANT) 60 MG capsule Take 60 mg by mouth Daily.     • [DISCONTINUED] diphenhydrAMINE (BENADRYL) 25 mg capsule Take 25 mg by mouth Every Night.     • [DISCONTINUED] hyoscyamine (ANASPAZ,LEVSIN) 0.125 MG tablet Dissolve 1-2 tablets under the tongue every 4 hours as needed for abdominal pain 360 tablet 2     No facility-administered encounter medications on file as of 10/15/2019.        Continuous Infusions:  No current facility-administered medications for this visit.     PRN Meds:.    Allergies   Allergen Reactions   • Penicillins Rash   • Sulfa Antibiotics Rash       Social History     Socioeconomic History   • Marital status:      Spouse name: Not on file   • Number of children: Not on file   • Years of education: Not on file   • Highest education level: Not on file   Tobacco Use   • Smoking status: Never Smoker   • Smokeless tobacco: Never Used   Substance and Sexual Activity   • Alcohol use: No   • Drug use: No       Family History   Problem Relation Age of Onset   • Stomach cancer Paternal Uncle    • Stomach cancer Paternal Grandmother        Review of Systems   Constitutional: Negative for activity change, appetite change, fatigue, fever and unexpected weight change.   HENT: Negative for trouble swallowing.    Respiratory: Negative for apnea, cough, choking, chest tightness, shortness of breath and wheezing.    Cardiovascular: Negative for chest pain, palpitations and leg swelling.   Gastrointestinal: Negative for abdominal distention, abdominal pain, anal bleeding, blood in stool, constipation, diarrhea, nausea, rectal pain and vomiting.       Vitals:    10/15/19 1525   BP: 116/70   Temp: 97.9 °F (36.6 °C)       Physical Exam   Constitutional: She is oriented to person, place, and time. She appears well-developed and  "well-nourished.   Eyes: Pupils are equal, round, and reactive to light.   Cardiovascular: Normal rate, regular rhythm and normal heart sounds.   Pulmonary/Chest: Effort normal and breath sounds normal. No respiratory distress. She has no wheezes.   Abdominal: Soft. Bowel sounds are normal. She exhibits no distension and no mass. There is no tenderness. There is no guarding. No hernia.   Musculoskeletal: Normal range of motion.   Neurological: She is alert and oriented to person, place, and time.   Skin: Skin is warm and dry. Capillary refill takes less than 2 seconds.   Psychiatric: She has a normal mood and affect. Her behavior is normal.       No images are attached to the encounter.    Leandra was seen today for heartburn.    Diagnoses and all orders for this visit:    Gastroesophageal reflux disease, esophagitis presence not specified      Impression:    Pleasant 54-year-old female seen today in follow-up continues to do well on Dexilant 60 mg once daily.  No alarm symptoms such as dysphagia, weight loss or rectal bleeding.    I also counseled the patient on GERD diet and  lifestyle modification such as avoiding lying down immediately after eating, avoiding overeating, benefits of weight reduction, benefits of elevating the head of her bed 6 inches to prevent reflux while sleeping. We also reviewed foods that  can lower esophageal sphincter such as fatty or fried foods, whole milk, chocolate, cream foods, peppermint and spearmint. Also recommend avoidance of  foods high in fat, alcohol, citrus fruits, and desserts made with oils.    She was provided with educational handout.    She is up-to-date in terms of colon cancer screening.    She can continue current PPI therapy.  We did discuss possibility of decreasing to 30 mg a day in the future but patient would like to \" think about it.\"    Return to clinic 1 year or sooner if needed.    (I spent a total of 20 minutes in direct patient care including face-to-face " examination. 15 minutes were spent in coordination of care and counseling the patient extensively on dietary changes related to GERD.)

## 2020-03-25 RX ORDER — DEXLANSOPRAZOLE 60 MG/1
CAPSULE, DELAYED RELEASE ORAL
Qty: 90 CAPSULE | Refills: 0 | OUTPATIENT
Start: 2020-03-25

## 2020-07-09 ENCOUNTER — TELEPHONE (OUTPATIENT)
Dept: GASTROENTEROLOGY | Facility: CLINIC | Age: 55
End: 2020-07-09

## 2020-07-09 RX ORDER — DEXLANSOPRAZOLE 60 MG/1
60 CAPSULE, DELAYED RELEASE ORAL
Qty: 90 CAPSULE | Refills: 0 | Status: SHIPPED | OUTPATIENT
Start: 2020-07-09 | End: 2020-10-12 | Stop reason: SDUPTHER

## 2020-07-09 NOTE — TELEPHONE ENCOUNTER
----- Message from Saqib Merritt sent at 7/9/2020  3:11 PM EDT -----  Regarding: DEXILANT 60 MG capsule  Contact: [phone]  Pt needs prescription called in.    [facility] [address] [place] [address] [address] - [phone]  - [phone] FX Phone:  [phone]

## 2020-07-30 ENCOUNTER — TELEPHONE (OUTPATIENT)
Dept: GASTROENTEROLOGY | Facility: CLINIC | Age: 55
End: 2020-07-30

## 2020-07-30 DIAGNOSIS — K21.9 GASTROESOPHAGEAL REFLUX DISEASE, ESOPHAGITIS PRESENCE NOT SPECIFIED: Primary | ICD-10-CM

## 2020-07-30 NOTE — TELEPHONE ENCOUNTER
Returned patient's phone call. She states despite taking Dexilant 60 mg twice a day, she is still experiencing severe GERD symptoms.   She states she has been researching Transoral Incisionaless Fundoplication(TIF). She would like to discuss the procedure with Ana María. She states there is a physician in [place] who does the procedure.   Advised will send an update to Ana María, advised she may want to discuss this with Dr. Mike before calling her. Advised Dr. Mike is out of the office until 8/4. She verb understanding and is agreeable to the plan.

## 2020-07-30 NOTE — TELEPHONE ENCOUNTER
----- Message from YAO Marrero sent at 7/29/2020  8:48 AM EDT -----  Regarding: FW: Non-Urgent Medical Question  Contact: [phone]  Can you triage her issues first please.    ----- Message -----  From: Marizol Carson RN  Sent: 7/28/2020   4:48 PM EDT  To: YAO Marrero  Subject: FW: Non-Urgent Medical Question                      ----- Message -----  From: Leandra Corona  Sent: 7/28/2020   3:10 PM EDT  To: [facility]  Subject: Non-Urgent Medical Question                      I would like the nurse practitioner for Dr Briones to please give me a call. My number is [phone].  Thank you, Leandra Corona

## 2020-07-30 NOTE — TELEPHONE ENCOUNTER
Patient had a normal EGD in April 2019.  Had been doing quite well on Dexilant 60 mg once daily.  Now she is calling and asking if she could get a fundoplication.  Would you recommend esophagram or upper GI series first?

## 2020-08-05 PROBLEM — K21.9 GASTROESOPHAGEAL REFLUX DISEASE: Status: ACTIVE | Noted: 2020-08-05

## 2020-10-12 ENCOUNTER — TELEPHONE (OUTPATIENT)
Dept: GASTROENTEROLOGY | Facility: CLINIC | Age: 55
End: 2020-10-12

## 2020-10-12 NOTE — TELEPHONE ENCOUNTER
----- Message from Saqib Romeo sent at 10/12/2020  3:59 PM EDT -----  Regarding: Request for refill for dexlansoprazole (DEXILANT) 60 MG capsule  [facility] pharmacy calling about refill, sent over request by fax and electronically.   Spoke to Mariposa at [facility]. [phone]      dexlansoprazole (DEXILANT) 60 MG capsule 90 capsule 2 9/26/2019    Sig - Route: Take 1 capsule by mouth Every Morning Before Breakfast. - Oral   Sent to pharmacy as: Dexlansoprazole 60 MG Oral Capsule Delayed Release   E-Prescribing Status: Receipt confirmed by pharmacy (9/26/2019  4:06 PM EDT)   Pharmacy    [facility] [address] [place], [place] [address] - [facility] [address] - [phone]  - [phone] FX

## 2020-10-13 RX ORDER — DEXLANSOPRAZOLE 60 MG/1
60 CAPSULE, DELAYED RELEASE ORAL
Qty: 90 CAPSULE | Refills: 3 | Status: SHIPPED | OUTPATIENT
Start: 2020-10-13 | End: 2021-04-10

## 2020-10-30 ENCOUNTER — OFFICE VISIT (OUTPATIENT)
Dept: GASTROENTEROLOGY | Facility: CLINIC | Age: 55
End: 2020-10-30

## 2020-10-30 VITALS — TEMPERATURE: 97.8 F | HEIGHT: 64 IN | WEIGHT: 121 LBS | BODY MASS INDEX: 20.66 KG/M2

## 2020-10-30 DIAGNOSIS — R10.13 DYSPEPSIA: ICD-10-CM

## 2020-10-30 DIAGNOSIS — R10.10 PAIN OF UPPER ABDOMEN: Primary | ICD-10-CM

## 2020-10-30 PROCEDURE — 99214 OFFICE O/P EST MOD 30 MIN: CPT | Performed by: NURSE PRACTITIONER

## 2020-10-30 NOTE — PROGRESS NOTES
Chief Complaint   Patient presents with   • Dyspepsia   • Abdominal Pain     Epigastric burning     HPI    Leandra Corona is a  55 y.o. female here for a follow up visit for dyspepsia and epigastric pain described as a burning sensation.  This patient follows with Dr. Mike, known to me.     Reviewed prior colonoscopy and EGD performed in April 2019 with normal upper endoscopy, nonbleeding internal hemorrhoids otherwise normal examination of the colon.     Revieweded CT of abdomen pelvis from May 2019--status post cholecystectomy hysterectomy, no acute process identified.    Over the past several months patient's developed breakthrough dyspeptic symptoms despite Dexilant 60 mg once daily with associated epigastric pain described as a burning sensation that occurs with dyspepsia.  We were attempting to arrange an outpatient EGD with Bravo study but patient has had a prolonged procedure due to pandemic and her work schedule.  She presents today with continued symptoms.  No nausea, vomiting, or dysphagia.  Appetite is good.  Her weight is stable.    No diarrhea, constipation, or rectal bleeding.    Past Medical History:   Diagnosis Date   • GERD (gastroesophageal reflux disease)        Past Surgical History:   Procedure Laterality Date   • CHOLECYSTECTOMY     • COLONOSCOPY  05/01/2019    IH, otherwise normal   • COLONOSCOPY     • COLONOSCOPY  05/01/2019    NBIH, normal ileum, no specimens collected   • COLONOSCOPY W/ BIOPSIES  08/17/2010    ih, path benign   • ENDOSCOPY  05/01/2019    normal   • ENDOSCOPY  05/01/2019    normal exam, path: mild reactive gastropathy   • HYSTERECTOMY     • UPPER GASTROINTESTINAL ENDOSCOPY  08/17/2010    acute gastritis, path mild chronic gastritis       Scheduled Meds:  Outpatient Encounter Medications as of 10/30/2020   Medication Sig Dispense Refill   • dexlansoprazole (DEXILANT) 60 MG capsule Take 1 capsule by mouth Every Morning Before Breakfast. 90 capsule 3   • doxylamine (UNISOM)  25 MG tablet Take 25 mg by mouth At Night As Needed for Sleep (1/2 tab qhs).     • hyoscyamine (LEVSIN) 0.125 MG SL tablet One to two tablets every 4 hours as needed for pain/cramping. 60 tablet 5   • melatonin 5 MG tablet tablet Take 5 mg by mouth At Night As Needed.       No facility-administered encounter medications on file as of 10/30/2020.        Continuous Infusions:No current facility-administered medications for this visit.       PRN Meds:.    Allergies   Allergen Reactions   • Penicillins Rash   • Sulfa Antibiotics Rash       Social History     Socioeconomic History   • Marital status:      Spouse name: Not on file   • Number of children: Not on file   • Years of education: Not on file   • Highest education level: Not on file   Tobacco Use   • Smoking status: Never Smoker   • Smokeless tobacco: Never Used   Substance and Sexual Activity   • Alcohol use: No   • Drug use: No       Family History   Problem Relation Age of Onset   • Stomach cancer Paternal Uncle    • Stomach cancer Paternal Grandmother        Review of Systems   Constitutional: Negative for activity change, appetite change, fatigue, fever and unexpected weight change.   HENT: Negative for trouble swallowing.    Respiratory: Negative for apnea, cough, choking, chest tightness, shortness of breath and wheezing.    Cardiovascular: Negative for chest pain, palpitations and leg swelling.   Gastrointestinal: Positive for abdominal pain. Negative for abdominal distention, anal bleeding, blood in stool, constipation, diarrhea, nausea, rectal pain and vomiting.       Vitals:    10/30/20 1255   Temp: 97.8 °F (36.6 °C)       Physical Exam  Constitutional:       Appearance: She is well-developed.   Cardiovascular:      Rate and Rhythm: Normal rate and regular rhythm.      Heart sounds: Normal heart sounds.   Pulmonary:      Effort: Pulmonary effort is normal. No respiratory distress.      Breath sounds: Normal breath sounds. No wheezing.    Abdominal:      General: Bowel sounds are normal. There is no distension.      Palpations: Abdomen is soft. There is no mass.      Tenderness: There is no abdominal tenderness. There is no guarding.      Hernia: No hernia is present.   Skin:     General: Skin is warm and dry.      Capillary Refill: Capillary refill takes less than 2 seconds.   Neurological:      Mental Status: She is alert and oriented to person, place, and time.   Psychiatric:         Behavior: Behavior normal.       Problem list    Dyspepsia  Epigastric burning/pain    Assessment/plan    Arrange EGD with Bravo testing to assess the extent of reflux and whether or not patient would benefit from a Nissen fundoplication.  In the meanwhile increase Dexilant to twice daily dosing.  Additional samples provided to the patient.  Update labs with CBC and CMP.  If EGD w/ Bravo testing unremarkable consider MRCP for evaluation of biliary tree.  Further recommendations and follow-up pending the aforementioned work-up.

## 2020-11-23 ENCOUNTER — TELEPHONE (OUTPATIENT)
Dept: GASTROENTEROLOGY | Facility: CLINIC | Age: 55
End: 2020-11-23

## 2020-11-23 NOTE — TELEPHONE ENCOUNTER
----- Message from Tristen Mike MD sent at 11/23/2020 12:55 PM EST -----  Contact: [phone]  thx  ----- Message -----  From: Ghislaine Chiang  Sent: 11/23/2020  12:47 PM EST  To: Tristen Mike MD    FYI:  Covid  spoke with patient.  Patient stated she is having Covid test in [place] on 11/27 and be bringing the results with her the day of her procedure.

## 2020-11-24 ENCOUNTER — TELEPHONE (OUTPATIENT)
Dept: GASTROENTEROLOGY | Facility: CLINIC | Age: 55
End: 2020-11-24

## 2020-12-08 ENCOUNTER — OFFICE VISIT (OUTPATIENT)
Dept: FAMILY MEDICINE CLINIC | Facility: CLINIC | Age: 55
End: 2020-12-08

## 2020-12-08 VITALS
HEIGHT: 64 IN | DIASTOLIC BLOOD PRESSURE: 74 MMHG | RESPIRATION RATE: 16 BRPM | OXYGEN SATURATION: 99 % | BODY MASS INDEX: 20.79 KG/M2 | HEART RATE: 82 BPM | WEIGHT: 121.8 LBS | SYSTOLIC BLOOD PRESSURE: 124 MMHG

## 2020-12-08 DIAGNOSIS — Z00.00 WELL ADULT EXAM: ICD-10-CM

## 2020-12-08 DIAGNOSIS — Z23 ENCOUNTER FOR IMMUNIZATION: Primary | ICD-10-CM

## 2020-12-08 PROCEDURE — 90715 TDAP VACCINE 7 YRS/> IM: CPT | Performed by: INTERNAL MEDICINE

## 2020-12-08 PROCEDURE — 99396 PREV VISIT EST AGE 40-64: CPT | Performed by: INTERNAL MEDICINE

## 2020-12-08 PROCEDURE — 90471 IMMUNIZATION ADMIN: CPT | Performed by: INTERNAL MEDICINE

## 2020-12-08 RX ORDER — FAMOTIDINE 40 MG/1
40 TABLET, FILM COATED ORAL DAILY
Qty: 90 TABLET | Refills: 1 | Status: SHIPPED | OUTPATIENT
Start: 2020-12-08 | End: 2021-04-16

## 2020-12-08 NOTE — PROGRESS NOTES
"Kamar Corona is a 55 y.o. female who comes in today for   Chief Complaint   Patient presents with   • Annual Exam     cpe    • Establish Care   .    History of Present Illness   Here for CPE.  She sees a gastroenterologist for GERD and has had CCX 10 years ago.  She is on dexilant and sees  and his NP.  Wondering about being on a PPI long term but without it, she has GERD sx's.  Wondering about TIF procedure for GERD.  Without the PPI, she gets a bad taste in back of mouth and burning in the RUQ.   She sees gyn Dr Lee at [place] and neg MMG 9/2020.  CN was with Dr. Bermeo and was done with EGD 5/2019.  Over 10 years since last tetanus.  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  She has 2 grown children, a son and a daughter.  Her daughter is a nurse at [employer].  She is .  The following portions of the patient's history were reviewed and updated as appropriate: allergies, current medications, past family history, past medical history, past social history, past surgical history and problem list.    Review of Systems   Gastrointestinal:        GERD   All other systems reviewed and are negative.      /74   Pulse 82   Resp 16   Ht 162.6 cm (64\")   Wt 55.2 kg (121 lb 12.8 oz)   SpO2 99%   BMI 20.91 kg/m²     STEADI Fall Risk Assessment has not been completed.    No flowsheet data found.    Objective   Physical Exam  Vitals signs and nursing note reviewed.   Constitutional:       Appearance: Normal appearance. She is well-developed and normal weight.   HENT:      Head: Normocephalic and atraumatic.      Right Ear: External ear normal.      Left Ear: External ear normal.   Eyes:      Extraocular Movements: Extraocular movements intact.      Conjunctiva/sclera: Conjunctivae normal.   Neck:      Musculoskeletal: Neck supple.   Cardiovascular:      Rate and Rhythm: Normal rate and regular rhythm.      Heart sounds: Normal heart sounds.      " Comments: No bruits  Pulmonary:      Effort: Pulmonary effort is normal. No respiratory distress.      Breath sounds: Normal breath sounds. No wheezing or rales.   Abdominal:      General: Bowel sounds are normal. There is no distension.      Palpations: Abdomen is soft. There is no mass.      Tenderness: There is no abdominal tenderness.   Lymphadenopathy:      Cervical: No cervical adenopathy.   Skin:     General: Skin is warm.   Neurological:      General: No focal deficit present.      Mental Status: She is alert and oriented to person, place, and time.   Psychiatric:         Mood and Affect: Mood normal.         Behavior: Behavior normal.         Thought Content: Thought content normal.         Judgment: Judgment normal.           Current Outpatient Medications:   •  dexlansoprazole (DEXILANT) 60 MG capsule, Take 1 capsule by mouth Every Morning Before Breakfast., Disp: 90 capsule, Rfl: 3  •  doxylamine (UNISOM) 25 MG tablet, Take 25 mg by mouth At Night As Needed for Sleep (1/2 tab qhs)., Disp: , Rfl:   •  famotidine (Pepcid) 40 MG tablet, Take 1 tablet by mouth Daily., Disp: 90 tablet, Rfl: 1    Assessment/Plan   Diagnoses and all orders for this visit:    1. Encounter for immunization (Primary)  -     Tdap Vaccine Greater Than or Equal To 6yo IM    2. Well adult exam  -     CBC & Differential  -     Comprehensive Metabolic Panel  -     Lipid Panel With LDL / HDL Ratio  -     TSH  -     T4, Free  -     Urinalysis With Culture If Indicated -  -     Vitamin D 25 Hydroxy    Other orders  -     famotidine (Pepcid) 40 MG tablet; Take 1 tablet by mouth Daily.  Dispense: 90 tablet; Refill: 1      Tdap today  Fasting labs ordered due to physical  Trial of Pepcid 40 mg daily instead of her Dexilant.  If she has breakthrough symptoms she can take as needed Dexilant.  The goal would be to get her off the PPI due to the additive risk of long-term effects of PPI on bone density.  She is very petite and thin which puts her  at higher risk for bone density issues.  She is followed by gynecologist who orders her mammogram and bone density.  She is not due for colonoscopy at this time.  She is already had her flu shot.  Shingles vaccination she can get at her pharmacy at her convenience.  She does not tolerate Pepcid instead of her Dexilant she can remain on the PPI.  We did want to just give a try of taking a H2 blocker to see if that would control her symptoms.  I will see her back in 1 year or sooner if needed.             I have asked for the patient to return to clinic in 12month(s).

## 2020-12-10 LAB
25(OH)D3+25(OH)D2 SERPL-MCNC: 31.1 NG/ML (ref 30–100)
ALBUMIN SERPL-MCNC: 4.4 G/DL (ref 3.5–5.2)
ALBUMIN/GLOB SERPL: 2 G/DL
ALP SERPL-CCNC: 107 U/L (ref 39–117)
ALT SERPL-CCNC: 20 U/L (ref 1–33)
APPEARANCE UR: CLEAR
AST SERPL-CCNC: 20 U/L (ref 1–32)
BACTERIA #/AREA URNS HPF: ABNORMAL /HPF
BACTERIA UR CULT: NORMAL
BACTERIA UR CULT: NORMAL
BASOPHILS # BLD AUTO: 0.07 10*3/MM3 (ref 0–0.2)
BASOPHILS NFR BLD AUTO: 1.1 % (ref 0–1.5)
BILIRUB SERPL-MCNC: 0.3 MG/DL (ref 0–1.2)
BILIRUB UR QL STRIP: NEGATIVE
BUN SERPL-MCNC: 13 MG/DL (ref 6–20)
BUN/CREAT SERPL: 18.3 (ref 7–25)
CALCIUM SERPL-MCNC: 9.4 MG/DL (ref 8.6–10.5)
CHLORIDE SERPL-SCNC: 103 MMOL/L (ref 98–107)
CHOLEST SERPL-MCNC: 175 MG/DL (ref 0–200)
CO2 SERPL-SCNC: 30.3 MMOL/L (ref 22–29)
COLOR UR: YELLOW
CREAT SERPL-MCNC: 0.71 MG/DL (ref 0.57–1)
CRYSTALS URNS MICRO: ABNORMAL
EOSINOPHIL # BLD AUTO: 0.09 10*3/MM3 (ref 0–0.4)
EOSINOPHIL NFR BLD AUTO: 1.4 % (ref 0.3–6.2)
EPI CELLS #/AREA URNS HPF: ABNORMAL /HPF (ref 0–10)
ERYTHROCYTE [DISTWIDTH] IN BLOOD BY AUTOMATED COUNT: 12.6 % (ref 12.3–15.4)
GLOBULIN SER CALC-MCNC: 2.2 GM/DL
GLUCOSE SERPL-MCNC: 85 MG/DL (ref 65–99)
GLUCOSE UR QL: NEGATIVE
HCT VFR BLD AUTO: 40.3 % (ref 34–46.6)
HDLC SERPL-MCNC: 83 MG/DL (ref 40–60)
HGB BLD-MCNC: 13.5 G/DL (ref 12–15.9)
HGB UR QL STRIP: NEGATIVE
IMM GRANULOCYTES # BLD AUTO: 0.02 10*3/MM3 (ref 0–0.05)
IMM GRANULOCYTES NFR BLD AUTO: 0.3 % (ref 0–0.5)
KETONES UR QL STRIP: NEGATIVE
LDLC SERPL CALC-MCNC: 78 MG/DL (ref 0–100)
LDLC/HDLC SERPL: 0.92 {RATIO}
LEUKOCYTE ESTERASE UR QL STRIP: ABNORMAL
LYMPHOCYTES # BLD AUTO: 2.33 10*3/MM3 (ref 0.7–3.1)
LYMPHOCYTES NFR BLD AUTO: 36.6 % (ref 19.6–45.3)
MCH RBC QN AUTO: 29.4 PG (ref 26.6–33)
MCHC RBC AUTO-ENTMCNC: 33.5 G/DL (ref 31.5–35.7)
MCV RBC AUTO: 87.8 FL (ref 79–97)
MICRO URNS: ABNORMAL
MONOCYTES # BLD AUTO: 0.69 10*3/MM3 (ref 0.1–0.9)
MONOCYTES NFR BLD AUTO: 10.8 % (ref 5–12)
MUCOUS THREADS URNS QL MICRO: PRESENT /HPF
NEUTROPHILS # BLD AUTO: 3.16 10*3/MM3 (ref 1.7–7)
NEUTROPHILS NFR BLD AUTO: 49.8 % (ref 42.7–76)
NITRITE UR QL STRIP: NEGATIVE
NRBC BLD AUTO-RTO: 0 /100 WBC (ref 0–0.2)
PH UR STRIP: 7 [PH] (ref 5–7.5)
PLATELET # BLD AUTO: 246 10*3/MM3 (ref 140–450)
POTASSIUM SERPL-SCNC: 4.2 MMOL/L (ref 3.5–5.2)
PROT SERPL-MCNC: 6.6 G/DL (ref 6–8.5)
PROT UR QL STRIP: NEGATIVE
RBC # BLD AUTO: 4.59 10*6/MM3 (ref 3.77–5.28)
RBC #/AREA URNS HPF: ABNORMAL /HPF (ref 0–2)
SODIUM SERPL-SCNC: 141 MMOL/L (ref 136–145)
SP GR UR: 1.02 (ref 1–1.03)
T4 FREE SERPL-MCNC: 1.22 NG/DL (ref 0.93–1.7)
TRIGL SERPL-MCNC: 77 MG/DL (ref 0–150)
TSH SERPL DL<=0.005 MIU/L-ACNC: 1.81 UIU/ML (ref 0.27–4.2)
UNIDENT CRYS URNS QL MICRO: PRESENT /LPF
URINALYSIS REFLEX: ABNORMAL
UROBILINOGEN UR STRIP-MCNC: 1 MG/DL (ref 0.2–1)
VLDLC SERPL CALC-MCNC: 14 MG/DL (ref 5–40)
WBC # BLD AUTO: 6.36 10*3/MM3 (ref 3.4–10.8)
WBC #/AREA URNS HPF: ABNORMAL /HPF (ref 0–5)

## 2021-04-09 ENCOUNTER — TELEPHONE (OUTPATIENT)
Dept: GASTROENTEROLOGY | Facility: CLINIC | Age: 56
End: 2021-04-09

## 2021-04-09 NOTE — TELEPHONE ENCOUNTER
----- Message from Saqib Sequeira sent at 4/9/2021 11:56 AM EDT -----  Regarding: med release  Contact: [phone]  Pt has changed insurance. Tarah  has fax us to release med to have med covered under new insurance    dexlansoprazole (DEXILANT) 60 MG capsule 90 capsule 3 10/13/2020    Sig - Route: Take 1 capsule by mouth Every Morning Before Breakfast. - Oral   Sent to pharmacy as: Dexlansoprazole 60 MG Oral Capsule Delayed Release (DEXILANT)   E-Prescribing Status: Receipt confirmed by pharmacy (10/13/2020 12:49 PM EDT)   Pharmacy    [facility] [address] [place] [address], [place] - [facility] - [phone]  - [phone] FX

## 2021-04-09 NOTE — TELEPHONE ENCOUNTER
----- Message from Saqib Smith Rep sent at 4/9/2021  1:03 PM EDT -----  Regarding: dexlansoprazole (DEXILANT) 60 MG capsule PA  Contact: [phone]  PT needs a PA submitted for dexlansoprazole (DEXILANT) 60 MG capsule , please send      [facility] pharmacy would like an update when submitted  [phone]

## 2021-04-10 RX ORDER — DEXLANSOPRAZOLE 60 MG/1
CAPSULE, DELAYED RELEASE ORAL
Qty: 90 CAPSULE | Refills: 0 | Status: SHIPPED | OUTPATIENT
Start: 2021-04-10 | End: 2021-04-16

## 2021-04-13 NOTE — TELEPHONE ENCOUNTER
Faxed additional clinicals to [facility] [phone] with confirmation received. Awaiting decision

## 2021-04-14 NOTE — TELEPHONE ENCOUNTER
Dexilant approved. Letter to be scanned to media upon approval. Patient has been notified.    Approved today  Effective from 04/14/2021 through 04/14/2022.

## 2021-04-16 RX ORDER — LANSOPRAZOLE 30 MG/1
30 CAPSULE, DELAYED RELEASE ORAL 2 TIMES DAILY
Qty: 60 CAPSULE | Refills: 5 | Status: SHIPPED | OUTPATIENT
Start: 2021-04-16 | End: 2021-12-03 | Stop reason: SDUPTHER

## 2021-12-03 ENCOUNTER — OFFICE VISIT (OUTPATIENT)
Dept: GASTROENTEROLOGY | Facility: CLINIC | Age: 56
End: 2021-12-03

## 2021-12-03 ENCOUNTER — TELEPHONE (OUTPATIENT)
Dept: GASTROENTEROLOGY | Facility: CLINIC | Age: 56
End: 2021-12-03

## 2021-12-03 VITALS — TEMPERATURE: 96.6 F | BODY MASS INDEX: 21.51 KG/M2 | WEIGHT: 126 LBS | HEIGHT: 64 IN

## 2021-12-03 DIAGNOSIS — R10.10 PAIN OF UPPER ABDOMEN: Primary | ICD-10-CM

## 2021-12-03 DIAGNOSIS — R10.13 DYSPEPSIA: ICD-10-CM

## 2021-12-03 PROCEDURE — 99214 OFFICE O/P EST MOD 30 MIN: CPT | Performed by: NURSE PRACTITIONER

## 2021-12-03 RX ORDER — LANSOPRAZOLE 30 MG/1
30 CAPSULE, DELAYED RELEASE ORAL 2 TIMES DAILY
Qty: 180 CAPSULE | Refills: 3 | Status: SHIPPED | OUTPATIENT
Start: 2021-12-03 | End: 2022-03-14

## 2021-12-03 NOTE — PROGRESS NOTES
Chief Complaint   Patient presents with   • Heartburn       HPI    Leandra Corona is a  56 y.o. female here for a follow up visit for dyspepsia and epigastric pain.    This patient follows with Dr. Mike and myself.    Reviewed prior colonoscopy and EGD performed in April 2019 with normal upper endoscopy, nonbleeding internal hemorrhoids otherwise normal examination of the colon.    Recommended on several occasions for her to complete a EGD with Bravo study to to establish whether or not she would be a candidate for antireflux surgery.  Patient has been procrastinating secondary to pandemic, her work schedule, and buying and selling a new house.  That being said she is still struggling with dyspeptic symptoms and epigastric pain despite following antireflux diet and having a normal BMI.  She is on lansoprazole 30 mg p.o. twice daily.  Denies dysphagia, odynophagia, nausea, or vomiting.    No lower GI complaints.    Past Medical History:   Diagnosis Date   • GERD (gastroesophageal reflux disease)        Past Surgical History:   Procedure Laterality Date   • CHOLECYSTECTOMY     • COLONOSCOPY  05/01/2019    IH, otherwise normal   • COLONOSCOPY     • COLONOSCOPY  05/01/2019    NBIH, normal ileum, no specimens collected   • COLONOSCOPY W/ BIOPSIES  08/17/2010    ih, path benign   • ENDOSCOPY  05/01/2019    normal   • ENDOSCOPY  05/01/2019    normal exam, path: mild reactive gastropathy   • HYSTERECTOMY     • UPPER GASTROINTESTINAL ENDOSCOPY  08/17/2010    acute gastritis, path mild chronic gastritis       Scheduled Meds:     Continuous Infusions: No current facility-administered medications for this visit.      PRN Meds:     Allergies   Allergen Reactions   • Penicillins Rash   • Sulfa Antibiotics Rash       Social History     Socioeconomic History   • Marital status:    Tobacco Use   • Smoking status: Never Smoker   • Smokeless tobacco: Never Used   Substance and Sexual Activity   • Alcohol use: No   • Drug use:  No       Family History   Problem Relation Age of Onset   • Kidney cancer Father    • Lung disease Mother    • Stomach cancer Paternal Uncle    • Stomach cancer Paternal Grandmother        Review of Systems   Constitutional: Negative for activity change, appetite change, fatigue, fever and unexpected weight change.   HENT: Negative for trouble swallowing.    Respiratory: Negative for apnea, cough, choking, chest tightness, shortness of breath and wheezing.    Cardiovascular: Negative for chest pain, palpitations and leg swelling.   Gastrointestinal: Positive for abdominal pain. Negative for abdominal distention, anal bleeding, blood in stool, constipation, diarrhea, nausea, rectal pain and vomiting.       Vitals:    12/03/21 1448   Temp: 96.6 °F (35.9 °C)       Physical Exam  Constitutional:       Appearance: She is well-developed.   Abdominal:      General: Bowel sounds are normal. There is no distension.      Palpations: Abdomen is soft. There is no mass.      Tenderness: There is no abdominal tenderness. There is no guarding.      Hernia: No hernia is present.   Skin:     General: Skin is warm and dry.      Capillary Refill: Capillary refill takes less than 2 seconds.   Neurological:      Mental Status: She is alert and oriented to person, place, and time.   Psychiatric:         Behavior: Behavior normal.     Assessment    Diagnoses and all orders for this visit:    1. Pain of upper abdomen (Primary)  Overview:  Added automatically from request for surgery [id number]    Orders:  -     Case Request; Standing  -     Case Request    2. Dyspepsia  Overview:  Added automatically from request for surgery [id number]    Orders:  -     Case Request; Standing  -     Case Request    Other orders  -     lansoprazole (PREVACID) 30 MG capsule; Take 1 capsule by mouth 2 (Two) Times a Day.  Dispense: 180 capsule; Refill: 3     Plan    Again recommend EGD with Bravo testing to assess the extent of reflux and whether or not patient  would benefit from a Nissen fundoplication.  Continue twice daily dosing PPI therapy, refill provided.  If EGD with Bravo testing unremarkable consider MRCP for evaluation of biliary tree.  Further recommendations and follow-up pending the aforementioned work-up.         YAO Marrero  [facility]  [address] [place] [address]  Office: [phone]

## 2021-12-09 DIAGNOSIS — Z00.00 WELL ADULT EXAM: Primary | ICD-10-CM

## 2021-12-10 ENCOUNTER — LAB (OUTPATIENT)
Dept: FAMILY MEDICINE CLINIC | Facility: CLINIC | Age: 56
End: 2021-12-10

## 2021-12-14 ENCOUNTER — OFFICE VISIT (OUTPATIENT)
Dept: FAMILY MEDICINE CLINIC | Facility: CLINIC | Age: 56
End: 2021-12-14

## 2021-12-14 VITALS
RESPIRATION RATE: 16 BRPM | SYSTOLIC BLOOD PRESSURE: 126 MMHG | BODY MASS INDEX: 21.41 KG/M2 | WEIGHT: 125.4 LBS | HEIGHT: 64 IN | DIASTOLIC BLOOD PRESSURE: 68 MMHG | TEMPERATURE: 97.1 F | HEART RATE: 83 BPM | OXYGEN SATURATION: 98 %

## 2021-12-14 DIAGNOSIS — Z00.00 WELL ADULT EXAM: Primary | ICD-10-CM

## 2021-12-14 DIAGNOSIS — E55.9 VITAMIN D DEFICIENCY: Primary | ICD-10-CM

## 2021-12-14 LAB
ALBUMIN SERPL-MCNC: 4.6 G/DL (ref 3.8–4.9)
ALBUMIN/GLOB SERPL: 2.1 {RATIO} (ref 1.2–2.2)
ALP SERPL-CCNC: 102 IU/L (ref 44–121)
ALT SERPL-CCNC: 15 IU/L (ref 0–32)
APPEARANCE UR: CLEAR
AST SERPL-CCNC: 20 IU/L (ref 0–40)
BACTERIA #/AREA URNS HPF: NORMAL /[HPF]
BACTERIA UR CULT: NORMAL
BACTERIA UR CULT: NORMAL
BASOPHILS # BLD AUTO: 0.1 X10E3/UL (ref 0–0.2)
BASOPHILS NFR BLD AUTO: 1 %
BILIRUB SERPL-MCNC: 0.4 MG/DL (ref 0–1.2)
BILIRUB UR QL STRIP: NEGATIVE
BUN SERPL-MCNC: 15 MG/DL (ref 6–24)
BUN/CREAT SERPL: 19 (ref 9–23)
CALCIUM SERPL-MCNC: 9.6 MG/DL (ref 8.7–10.2)
CASTS URNS QL MICRO: NORMAL /LPF
CHLORIDE SERPL-SCNC: 104 MMOL/L (ref 96–106)
CHOLEST SERPL-MCNC: 172 MG/DL (ref 100–199)
CO2 SERPL-SCNC: 25 MMOL/L (ref 20–29)
COLOR UR: YELLOW
CREAT SERPL-MCNC: 0.77 MG/DL (ref 0.57–1)
EOSINOPHIL # BLD AUTO: 0.1 X10E3/UL (ref 0–0.4)
EOSINOPHIL NFR BLD AUTO: 1 %
EPI CELLS #/AREA URNS HPF: NORMAL /HPF (ref 0–10)
ERYTHROCYTE [DISTWIDTH] IN BLOOD BY AUTOMATED COUNT: 12.8 % (ref 11.7–15.4)
GLOBULIN SER CALC-MCNC: 2.2 G/DL (ref 1.5–4.5)
GLUCOSE SERPL-MCNC: 85 MG/DL (ref 65–99)
GLUCOSE UR QL: NEGATIVE
HCT VFR BLD AUTO: 43 % (ref 34–46.6)
HDLC SERPL-MCNC: 81 MG/DL
HGB BLD-MCNC: 13.9 G/DL (ref 11.1–15.9)
HGB UR QL STRIP: NEGATIVE
IMM GRANULOCYTES # BLD AUTO: 0 X10E3/UL (ref 0–0.1)
IMM GRANULOCYTES NFR BLD AUTO: 0 %
KETONES UR QL STRIP: NEGATIVE
LDLC SERPL CALC-MCNC: 79 MG/DL (ref 0–99)
LDLC/HDLC SERPL: 1 RATIO (ref 0–3.2)
LEUKOCYTE ESTERASE UR QL STRIP: ABNORMAL
LYMPHOCYTES # BLD AUTO: 1.9 X10E3/UL (ref 0.7–3.1)
LYMPHOCYTES NFR BLD AUTO: 32 %
MCH RBC QN AUTO: 29.1 PG (ref 26.6–33)
MCHC RBC AUTO-ENTMCNC: 32.3 G/DL (ref 31.5–35.7)
MCV RBC AUTO: 90 FL (ref 79–97)
MICRO URNS: ABNORMAL
MONOCYTES # BLD AUTO: 0.6 X10E3/UL (ref 0.1–0.9)
MONOCYTES NFR BLD AUTO: 10 %
NEUTROPHILS # BLD AUTO: 3.4 X10E3/UL (ref 1.4–7)
NEUTROPHILS NFR BLD AUTO: 56 %
NITRITE UR QL STRIP: NEGATIVE
PH UR STRIP: 7 [PH] (ref 5–7.5)
PLATELET # BLD AUTO: 254 X10E3/UL (ref 150–450)
POTASSIUM SERPL-SCNC: 4.5 MMOL/L (ref 3.5–5.2)
PROT SERPL-MCNC: 6.8 G/DL (ref 6–8.5)
PROT UR QL STRIP: NEGATIVE
RBC # BLD AUTO: 4.78 X10E6/UL (ref 3.77–5.28)
RBC #/AREA URNS HPF: NORMAL /HPF (ref 0–2)
SODIUM SERPL-SCNC: 142 MMOL/L (ref 134–144)
SP GR UR: 1.02 (ref 1–1.03)
T4 FREE SERPL-MCNC: 1.21 NG/DL (ref 0.82–1.77)
TRIGL SERPL-MCNC: 64 MG/DL (ref 0–149)
TSH SERPL DL<=0.005 MIU/L-ACNC: 1.9 UIU/ML (ref 0.45–4.5)
URINALYSIS REFLEX: ABNORMAL
UROBILINOGEN UR STRIP-MCNC: 0.2 MG/DL (ref 0.2–1)
VLDLC SERPL CALC-MCNC: 12 MG/DL (ref 5–40)
WBC # BLD AUTO: 6.1 X10E3/UL (ref 3.4–10.8)
WBC #/AREA URNS HPF: NORMAL /HPF (ref 0–5)

## 2021-12-14 PROCEDURE — 99396 PREV VISIT EST AGE 40-64: CPT | Performed by: INTERNAL MEDICINE

## 2021-12-14 NOTE — PROGRESS NOTES
"Chief Complaint  Annual Exam (cpe )    Subjective          Leandra Corona presents to [facility]  History of Present Illness  Here for CPE.  Went to see her gastroenterologist a few weeks ago due to GERD and stomach pains.  It has improved.  Some burning in the right side under her ribs and when she would lie down at night, she felt reflux in her chest.  Dr. Bermeo is her GI doctor.  She does take prevacid 30 mg qd.  She is going to do a pH probe looking to see if she is a candidate for TIF.    Takes 1/2 unisom qhs during work week.    Pap smear is overdue.  Has had hysterectomy;  Has one ovary.  She doesn't do pap smears anymore but has seen Dr. Farr's partner.    2019 was CN and repeat in 2029.   Last MMG was with her gyn  Objective   Vital Signs:   /68   Pulse 83   Temp 97.1 °F (36.2 °C) (Temporal)   Resp 16   Ht 162.6 cm (64\")   Wt 56.9 kg (125 lb 6.4 oz)   SpO2 98%   BMI 21.52 kg/m²     Physical Exam  Vitals and nursing note reviewed.   Constitutional:       Appearance: Normal appearance. She is well-developed.   HENT:      Head: Normocephalic and atraumatic.      Right Ear: External ear normal.      Left Ear: External ear normal.   Eyes:      Extraocular Movements: Extraocular movements intact.      Conjunctiva/sclera: Conjunctivae normal.   Neck:      Vascular: No carotid bruit.   Cardiovascular:      Rate and Rhythm: Normal rate and regular rhythm.      Heart sounds: Normal heart sounds.      Comments: No bruits  Pulmonary:      Effort: Pulmonary effort is normal. No respiratory distress.      Breath sounds: Normal breath sounds. No wheezing or rales.   Abdominal:      General: Bowel sounds are normal. There is no distension.      Palpations: Abdomen is soft. There is no mass.      Tenderness: There is no abdominal tenderness.   Musculoskeletal:      Cervical back: Neck supple.   Lymphadenopathy:      Cervical: No cervical adenopathy.   Skin:     General: Skin is " warm.   Neurological:      General: No focal deficit present.      Mental Status: She is alert and oriented to person, place, and time.   Psychiatric:         Mood and Affect: Mood normal.         Behavior: Behavior normal.         Thought Content: Thought content normal.         Judgment: Judgment normal.        Result Review :                SCANNED - COLONOSCOPY (05/01/2019)  Urine culture, Comprehensive - , (12/10/2021 09:09)  Microscopic Examination - (12/10/2021 09:09)  CBC & Differential (12/10/2021 09:09)  Comprehensive Metabolic Panel (12/10/2021 09:09)  Lipid Panel With LDL / HDL Ratio (12/10/2021 09:09)  TSH (12/10/2021 09:09)  T4, Free (12/10/2021 09:09)  Urinalysis With Culture If Indicated - Urine, Clean Catch (12/10/2021 09:09)    Assessment and Plan    Diagnoses and all orders for this visit:    1. Well adult exam (Primary)        Follow Up   Return in about 1 year (around 12/14/2022).  Patient was given instructions and counseling regarding her condition or for health maintenance advice. Please see specific information pulled into the AVS if appropriate.     Encouraged her to get in with her gyn or to see someone in our building.  Dr. Mcnamara and Dr. Mckeon names provided. She is over due for her screening MMG  A few bacteria in urine--increase hydration and start D mannose  Labs look great o/w  CN is up to date  Preventative counseling provided.

## 2021-12-16 LAB
25(OH)D3+25(OH)D2 SERPL-MCNC: 28.9 NG/ML (ref 30–100)
Lab: NORMAL
WRITTEN AUTHORIZATION: NORMAL

## 2022-03-14 RX ORDER — LANSOPRAZOLE 30 MG/1
CAPSULE, DELAYED RELEASE ORAL
Qty: 180 CAPSULE | Refills: 2 | Status: SHIPPED | OUTPATIENT
Start: 2022-03-14 | End: 2022-11-09 | Stop reason: SDUPTHER

## 2022-09-22 ENCOUNTER — TELEPHONE (OUTPATIENT)
Dept: FAMILY MEDICINE CLINIC | Facility: CLINIC | Age: 57
End: 2022-09-22

## 2022-09-22 DIAGNOSIS — Z12.31 ENCOUNTER FOR SCREENING MAMMOGRAM FOR BREAST CANCER: Primary | ICD-10-CM

## 2022-09-22 NOTE — TELEPHONE ENCOUNTER
Caller: Leandra Corona    Relationship: Self    Best call back number: [phone]    What orders are you requesting (i.e. lab or imaging): MAMMOGRAM     Where will you receive your lab/imaging services: [facility] OR [facility] TO PCP OFFICE

## 2022-09-28 ENCOUNTER — TELEPHONE (OUTPATIENT)
Dept: FAMILY MEDICINE CLINIC | Facility: CLINIC | Age: 57
End: 2022-09-28

## 2022-09-29 DIAGNOSIS — N64.4 BREAST PAIN, RIGHT: Primary | ICD-10-CM

## 2022-11-09 NOTE — TELEPHONE ENCOUNTER
Caller: Leandra Corona    Relationship: Self    Best call back number: [phone]    Requested Prescriptions:   Requested Prescriptions     Pending Prescriptions Disp Refills   • lansoprazole (PREVACID) 30 MG capsule 180 capsule 2     Sig: Take 1 capsule by mouth 2 (Two) Times a Day.        Pharmacy where request should be sent:  [facility]  [phone]    Additional details provided by patient: PATIENT WILL BE OUT OF MEDICATION BEFORE HER APPOINTMENT ON 12/8.    Does the patient have less than a 3 day supply:  [] Yes  [x] No    Saqib Bland Rep   11/09/22 11:03 EST

## 2022-11-11 RX ORDER — LANSOPRAZOLE 30 MG/1
30 CAPSULE, DELAYED RELEASE ORAL 2 TIMES DAILY
Qty: 180 CAPSULE | Refills: 0 | Status: SHIPPED | OUTPATIENT
Start: 2022-11-11 | End: 2022-12-08 | Stop reason: SDUPTHER

## 2022-12-08 ENCOUNTER — OFFICE VISIT (OUTPATIENT)
Dept: GASTROENTEROLOGY | Facility: CLINIC | Age: 57
End: 2022-12-08

## 2022-12-08 VITALS — BODY MASS INDEX: 21.09 KG/M2 | TEMPERATURE: 97.1 F | WEIGHT: 126.6 LBS | HEIGHT: 65 IN

## 2022-12-08 DIAGNOSIS — K21.9 GASTROESOPHAGEAL REFLUX DISEASE, UNSPECIFIED WHETHER ESOPHAGITIS PRESENT: Primary | ICD-10-CM

## 2022-12-08 PROCEDURE — 99213 OFFICE O/P EST LOW 20 MIN: CPT | Performed by: NURSE PRACTITIONER

## 2022-12-08 RX ORDER — LANSOPRAZOLE 30 MG/1
30 CAPSULE, DELAYED RELEASE ORAL 2 TIMES DAILY
Qty: 180 CAPSULE | Refills: 3 | Status: SHIPPED | OUTPATIENT
Start: 2022-12-08

## 2022-12-08 NOTE — PROGRESS NOTES
Chief Complaint   Patient presents with   • Heartburn       HPI    Leandra Corona is a  57 y.o. female here for a follow up visit for GERD.    This patient follows with Dr. Mike and myself.    Colonoscopy and EGD performed in April 2019 with normal upper endoscopy and nonbleeding troll hemorrhoids otherwise normal examination of the colon.    Patient recommend on several occasions for her to complete EGD with Bravo study to establish whether or not she would be a candidate for antireflux surgery.  Patient had been procrastinating secondary to pandemic, her work schedule, and social obligations.  She been maintained on twice daily dosing lansoprazole.  We again discussed EGD with Ramsey testing on last office visit in December 2021 however she canceled.    Today she still reports some breakthrough dyspeptic symptoms despite her twice daily dosing PPI regimen.  She was on Dexilant in the past which seemed to work just as well as lansoprazole twice daily however insurance stopped paying for it.  She is off and on antireflux diet.  Denies nausea, vomiting, odynophagia, or dysphagia.  Reports occasional epigastric discomfort and right breast discomfort.  She has a follow-up with her OB/GYN in January for plans for mammogram.    No lower GI complaints.  Due for screening colonoscopy in 2029.    Past Medical History:   Diagnosis Date   • GERD (gastroesophageal reflux disease)        Past Surgical History:   Procedure Laterality Date   • CHOLECYSTECTOMY     • COLONOSCOPY  05/01/2019    IH, otherwise normal   • COLONOSCOPY     • COLONOSCOPY  05/01/2019    NBIH, normal ileum, no specimens collected   • COLONOSCOPY W/ BIOPSIES  08/17/2010    ih, path benign   • ENDOSCOPY  05/01/2019    normal   • ENDOSCOPY  05/01/2019    normal exam, path: mild reactive gastropathy   • HYSTERECTOMY     • UPPER GASTROINTESTINAL ENDOSCOPY  08/17/2010    acute gastritis, path mild chronic gastritis       Scheduled Meds:     Continuous Infusions:  No current facility-administered medications for this visit.      PRN Meds:     Allergies   Allergen Reactions   • Penicillins Rash   • Sulfa Antibiotics Rash       Social History     Socioeconomic History   • Marital status:    Tobacco Use   • Smoking status: Never   • Smokeless tobacco: Never   Substance and Sexual Activity   • Alcohol use: No   • Drug use: No   • Sexual activity: Yes     Partners: Male     Birth control/protection: Post-menopausal, Hysterectomy       Family History   Problem Relation Age of Onset   • Kidney cancer Father    • Irritable bowel syndrome Father    • Lung disease Mother    • Stomach cancer Paternal Uncle    • Stomach cancer Paternal Grandmother        Review of Systems   Constitutional: Negative for activity change, appetite change, fatigue, fever and unexpected weight change.   HENT: Negative for trouble swallowing.    Respiratory: Negative for apnea, cough, choking, chest tightness, shortness of breath and wheezing.    Cardiovascular: Negative for chest pain, palpitations and leg swelling.   Gastrointestinal: Negative for abdominal distention, abdominal pain, anal bleeding, blood in stool, constipation, diarrhea, nausea, rectal pain and vomiting.        + GERD       Vitals:    12/08/22 1406   Temp: 97.1 °F (36.2 °C)       Physical Exam  Constitutional:       Appearance: She is well-developed.   Abdominal:      General: Bowel sounds are normal. There is no distension.      Palpations: Abdomen is soft. There is no mass.      Tenderness: There is no abdominal tenderness. There is no guarding.      Hernia: No hernia is present.   Skin:     General: Skin is warm and dry.      Capillary Refill: Capillary refill takes less than 2 seconds.   Neurological:      Mental Status: She is alert and oriented to person, place, and time.   Psychiatric:         Behavior: Behavior normal.     Assessment    Diagnoses and all orders for this visit:    1. Gastroesophageal reflux disease,  unspecified whether esophagitis present (Primary)    Plan    Pleasant 57-year-old female seen today for GERD maintained on twice daily dosing lansoprazole with occasional breakthrough.  We discussed strict antireflux dietary precautions with the addition of Gaviscon as needed.  Patient obviously does not have a desire to undergo invasive testing such as EGD with Bravo as she has canceled on multiple occasions.  We discussed trying insurance coverage for Dexilant again if symptoms do not improve with today's recommendations.  No alarm symptoms today such as dysphagia or weight loss.  Colonoscopy for screening purposes 2029.  RTC 1 year or sooner if needed.         YAO Marrero  [facility]  [address] [place] [address]  Office: [phone]

## 2022-12-28 ENCOUNTER — TELEPHONE (OUTPATIENT)
Dept: FAMILY MEDICINE CLINIC | Facility: CLINIC | Age: 57
End: 2022-12-28

## 2022-12-28 DIAGNOSIS — E55.9 VITAMIN D DEFICIENCY: ICD-10-CM

## 2022-12-28 DIAGNOSIS — Z00.00 WELL ADULT EXAM: Primary | ICD-10-CM

## 2022-12-31 LAB
25(OH)D3+25(OH)D2 SERPL-MCNC: 52 NG/ML (ref 30–100)
ALBUMIN SERPL-MCNC: 4.1 G/DL (ref 3.5–5.2)
ALBUMIN/GLOB SERPL: 2.1 G/DL
ALP SERPL-CCNC: 84 U/L (ref 39–117)
ALT SERPL-CCNC: 16 U/L (ref 1–33)
APPEARANCE UR: CLEAR
AST SERPL-CCNC: 20 U/L (ref 1–32)
BACTERIA #/AREA URNS HPF: NORMAL /HPF
BACTERIA UR CULT: NORMAL
BACTERIA UR CULT: NORMAL
BASOPHILS # BLD AUTO: 0.05 10*3/MM3 (ref 0–0.2)
BASOPHILS NFR BLD AUTO: 0.9 % (ref 0–1.5)
BILIRUB SERPL-MCNC: 0.3 MG/DL (ref 0–1.2)
BILIRUB UR QL STRIP: NEGATIVE
BUN SERPL-MCNC: 13 MG/DL (ref 6–20)
BUN/CREAT SERPL: 17.1 (ref 7–25)
CALCIUM SERPL-MCNC: 9 MG/DL (ref 8.6–10.5)
CASTS URNS QL MICRO: NORMAL /LPF
CHLORIDE SERPL-SCNC: 103 MMOL/L (ref 98–107)
CHOLEST SERPL-MCNC: 168 MG/DL (ref 0–200)
CO2 SERPL-SCNC: 29.1 MMOL/L (ref 22–29)
COLOR UR: YELLOW
CREAT SERPL-MCNC: 0.76 MG/DL (ref 0.57–1)
EGFRCR SERPLBLD CKD-EPI 2021: 91 ML/MIN/1.73
EOSINOPHIL # BLD AUTO: 0.08 10*3/MM3 (ref 0–0.4)
EOSINOPHIL NFR BLD AUTO: 1.4 % (ref 0.3–6.2)
EPI CELLS #/AREA URNS HPF: NORMAL /HPF (ref 0–10)
ERYTHROCYTE [DISTWIDTH] IN BLOOD BY AUTOMATED COUNT: 12.7 % (ref 12.3–15.4)
GLOBULIN SER CALC-MCNC: 2 GM/DL
GLUCOSE SERPL-MCNC: 116 MG/DL (ref 65–99)
GLUCOSE UR QL STRIP: NEGATIVE
HCT VFR BLD AUTO: 39.8 % (ref 34–46.6)
HDLC SERPL-MCNC: 69 MG/DL (ref 40–60)
HGB BLD-MCNC: 12.6 G/DL (ref 12–15.9)
HGB UR QL STRIP: NEGATIVE
IMM GRANULOCYTES # BLD AUTO: 0.01 10*3/MM3 (ref 0–0.05)
IMM GRANULOCYTES NFR BLD AUTO: 0.2 % (ref 0–0.5)
KETONES UR QL STRIP: NEGATIVE
LDLC SERPL CALC-MCNC: 84 MG/DL (ref 0–100)
LDLC/HDLC SERPL: 1.2 {RATIO}
LEUKOCYTE ESTERASE UR QL STRIP: ABNORMAL
LYMPHOCYTES # BLD AUTO: 1.85 10*3/MM3 (ref 0.7–3.1)
LYMPHOCYTES NFR BLD AUTO: 31.8 % (ref 19.6–45.3)
MCH RBC QN AUTO: 28.4 PG (ref 26.6–33)
MCHC RBC AUTO-ENTMCNC: 31.7 G/DL (ref 31.5–35.7)
MCV RBC AUTO: 89.8 FL (ref 79–97)
MICRO URNS: ABNORMAL
MONOCYTES # BLD AUTO: 0.57 10*3/MM3 (ref 0.1–0.9)
MONOCYTES NFR BLD AUTO: 9.8 % (ref 5–12)
NEUTROPHILS # BLD AUTO: 3.26 10*3/MM3 (ref 1.7–7)
NEUTROPHILS NFR BLD AUTO: 55.9 % (ref 42.7–76)
NITRITE UR QL STRIP: NEGATIVE
NRBC BLD AUTO-RTO: 0 /100 WBC (ref 0–0.2)
PH UR STRIP: 6.5 [PH] (ref 5–7.5)
PLATELET # BLD AUTO: 236 10*3/MM3 (ref 140–450)
POTASSIUM SERPL-SCNC: 4.3 MMOL/L (ref 3.5–5.2)
PROT SERPL-MCNC: 6.1 G/DL (ref 6–8.5)
PROT UR QL STRIP: NEGATIVE
RBC # BLD AUTO: 4.43 10*6/MM3 (ref 3.77–5.28)
RBC #/AREA URNS HPF: NORMAL /HPF (ref 0–2)
SODIUM SERPL-SCNC: 138 MMOL/L (ref 136–145)
SP GR UR STRIP: 1.01 (ref 1–1.03)
T4 FREE SERPL-MCNC: 1.2 NG/DL (ref 0.93–1.7)
TRIGL SERPL-MCNC: 82 MG/DL (ref 0–150)
TSH SERPL DL<=0.005 MIU/L-ACNC: 1.96 UIU/ML (ref 0.27–4.2)
URINALYSIS REFLEX: ABNORMAL
UROBILINOGEN UR STRIP-MCNC: 0.2 MG/DL (ref 0.2–1)
VLDLC SERPL CALC-MCNC: 15 MG/DL (ref 5–40)
WBC # BLD AUTO: 5.82 10*3/MM3 (ref 3.4–10.8)
WBC #/AREA URNS HPF: NORMAL /HPF (ref 0–5)

## 2023-01-05 ENCOUNTER — OFFICE VISIT (OUTPATIENT)
Dept: FAMILY MEDICINE CLINIC | Facility: CLINIC | Age: 58
End: 2023-01-05
Payer: COMMERCIAL

## 2023-01-05 VITALS
WEIGHT: 126.3 LBS | SYSTOLIC BLOOD PRESSURE: 114 MMHG | BODY MASS INDEX: 21.04 KG/M2 | HEIGHT: 65 IN | HEART RATE: 82 BPM | TEMPERATURE: 98.2 F | OXYGEN SATURATION: 100 % | DIASTOLIC BLOOD PRESSURE: 76 MMHG

## 2023-01-05 DIAGNOSIS — Z00.00 WELL ADULT EXAM: ICD-10-CM

## 2023-01-05 DIAGNOSIS — R73.9 ELEVATED BLOOD SUGAR: Primary | ICD-10-CM

## 2023-01-05 DIAGNOSIS — N64.4 BREAST PAIN, RIGHT: ICD-10-CM

## 2023-01-05 LAB — HBA1C MFR BLD: 5.2 % (ref 4.8–5.6)

## 2023-01-05 PROCEDURE — 99396 PREV VISIT EST AGE 40-64: CPT | Performed by: INTERNAL MEDICINE

## 2023-01-05 NOTE — PROGRESS NOTES
Chief Complaint  Annual Exam and Pain (Tender to the touch, intermittent dull pain under her armpit and radiates to the top of her right breast starting this past year.  It's uncomfortable to the point she can't wear fitted bras.  )    Kamar Corona presents to [facility]  History of Present Illness  Here for CPE.  C/o right axillary and upper breast pain off and on for 18 mo.  She forgot to say anything to me at her last CPE in December 2021.  She hasn't felt a lump or change in her breast.  She states that if her stomach flares up, it can make the right axillary pain worse.  Has had CCX.  Has GERD and is on prevacid 30 mg.  The breast pain is more of an ache and it seems more full on the right upper breast.  She called into our office in September 2022 and we ordered a diagnostic MMG and u/s but her breast is improved and she never did it. No caffeine intake. She decided to wait till she sees me now.  No rashes and no nipple discharge.  She has an appt next week with Dr. Isabel Mcnamara for a pap smear.  She has had one ovary removed and a hysterectomy.  Removed due to heavy cycles and painful cycles.  Surgery was about 21 years ago.  Neg fhx for breast cancer.  She does exercise regularly.  No fhx of type 2 DM or prediabetes  CN 2019 and negative with repeat in 2029.  She sees Dr. Bermeo's APRN yearly and just saw her in December 2022.        Objective   Vital Signs:  /76 (BP Location: Left arm, Patient Position: Sitting, Cuff Size: Adult)   Pulse 82   Temp 98.2 °F (36.8 °C) (Temporal)   Ht 163.8 cm (64.5\")   Wt 57.3 kg (126 lb 4.8 oz)   SpO2 100%   BMI 21.34 kg/m²   Estimated body mass index is 21.34 kg/m² as calculated from the following:    Height as of this encounter: 163.8 cm (64.5\").    Weight as of this encounter: 57.3 kg (126 lb 4.8 oz).    BMI is within normal parameters. No other follow-up for BMI required.      Physical Exam  Vitals and  nursing note reviewed.   Constitutional:       Appearance: Normal appearance. She is well-developed and normal weight.   HENT:      Head: Normocephalic and atraumatic.      Right Ear: External ear normal.      Left Ear: External ear normal.   Eyes:      Extraocular Movements: Extraocular movements intact.      Conjunctiva/sclera: Conjunctivae normal.   Neck:      Vascular: No carotid bruit.   Cardiovascular:      Rate and Rhythm: Normal rate and regular rhythm.      Heart sounds: Normal heart sounds.      Comments: No bruits  Pulmonary:      Effort: Pulmonary effort is normal. No respiratory distress.      Breath sounds: Normal breath sounds. No wheezing or rales.   Chest:      Chest wall: No mass, lacerations, deformity or swelling.   Breasts:     Right: Normal. No swelling, bleeding, inverted nipple, mass, nipple discharge, skin change or tenderness.      Left: Normal. No swelling, bleeding, inverted nipple, mass, nipple discharge, skin change or tenderness.      Comments: Fibrocystic changes bilaterally.  When sitting upright with arms down to side, there is a slight right pectoralis fullness w/o mass or palpable abnormality  Abdominal:      General: Bowel sounds are normal. There is no distension.      Palpations: Abdomen is soft. There is no mass.      Tenderness: There is no abdominal tenderness.   Musculoskeletal:      Cervical back: Neck supple.   Lymphadenopathy:      Cervical: No cervical adenopathy.   Skin:     General: Skin is warm.   Neurological:      General: No focal deficit present.      Mental Status: She is alert and oriented to person, place, and time.   Psychiatric:         Mood and Affect: Mood normal.         Behavior: Behavior normal.         Thought Content: Thought content normal.         Judgment: Judgment normal.        Result Review :                Assessment and Plan   Diagnoses and all orders for this visit:    1. Elevated blood sugar (Primary)  -     Hemoglobin A1c    2. Well adult  exam    3. Breast pain, right      Patient is having right-sided upper chest wall axillary breast pain with no palpable abnormality.  She has fibrocystic changes with minimal fat displacement.  She does have an asymmetry which is very minimal only noticed when sitting upright with arms to her side.  It could be just an asymmetric fat distribution.  I had ordered a mammogram diagnostic and ultrasound in September.  I called today to get her in and availability was scarce.  I was able to get her scheduled for tomorrow at [facility] which is where her previous mammograms were completed.   Unfortunately that day is not can to work for her as she is leaving for [place] tonight.  I did give her [facility]'s scheduling phone number as well as paper orders for diagnostic mammogram and ultrasound.  I encouraged her to call today to reschedule.  Vaccinations have been reviewed and are up-to-date other than she is eligible for another COVID booster.  Labs showed elevated blood glucose.  Today we will do an A1c.  Most likely elevated glucose is related to dietary intake of high sugar foods over the recent holiday.  I will see her back in 1 year or sooner if needed.  Continue healthy lifestyle choices with diet and exercise.  She sees Dr. Mcnamara next week..      Follow Up   No follow-ups on file.  Patient was given instructions and counseling regarding her condition or for health maintenance advice. Please see specific information pulled into the AVS if appropriate.

## 2023-01-09 ENCOUNTER — TELEPHONE (OUTPATIENT)
Dept: FAMILY MEDICINE CLINIC | Facility: CLINIC | Age: 58
End: 2023-01-09
Payer: COMMERCIAL

## 2023-01-09 NOTE — TELEPHONE ENCOUNTER
Left vm for pt to call back       ----- Message from Elmira Frey MD sent at 1/6/2023  8:17 AM EST -----  Overall sugar test is excellent.  No signs of diabetes.

## 2023-01-11 ENCOUNTER — OFFICE VISIT (OUTPATIENT)
Dept: OBSTETRICS AND GYNECOLOGY | Facility: CLINIC | Age: 58
End: 2023-01-11
Payer: COMMERCIAL

## 2023-01-11 VITALS
SYSTOLIC BLOOD PRESSURE: 126 MMHG | BODY MASS INDEX: 21.51 KG/M2 | WEIGHT: 126 LBS | DIASTOLIC BLOOD PRESSURE: 72 MMHG | HEIGHT: 64 IN

## 2023-01-11 DIAGNOSIS — Z13.820 SCREENING FOR OSTEOPOROSIS: ICD-10-CM

## 2023-01-11 DIAGNOSIS — Z01.419 ROUTINE GYNECOLOGICAL EXAMINATION: ICD-10-CM

## 2023-01-11 DIAGNOSIS — Z12.31 ENCOUNTER FOR SCREENING MAMMOGRAM FOR MALIGNANT NEOPLASM OF BREAST: ICD-10-CM

## 2023-01-11 DIAGNOSIS — Z01.419 PAP SMEAR, LOW-RISK: Primary | ICD-10-CM

## 2023-01-11 DIAGNOSIS — N64.4 MASTODYNIA: ICD-10-CM

## 2023-01-11 DIAGNOSIS — Z11.51 SPECIAL SCREENING EXAMINATION FOR HUMAN PAPILLOMAVIRUS (HPV): ICD-10-CM

## 2023-01-11 PROCEDURE — 99203 OFFICE O/P NEW LOW 30 MIN: CPT | Performed by: OBSTETRICS & GYNECOLOGY

## 2023-01-11 PROCEDURE — 99386 PREV VISIT NEW AGE 40-64: CPT | Performed by: OBSTETRICS & GYNECOLOGY

## 2023-01-11 NOTE — PROGRESS NOTES
"GYN Annual Exam     CC- Here for annual exam.     Leandra Corona is a 57 y.o. female new patient who presents for annual well woman exam and to discuss breast issues. She has had an 18 month h/o R breast pain in the UOQ of the R breast and into the axilla. This pain has worsened in the past 3 months. She feels like this area is \"puffy\" and is intermittently tender. It is made worse with tight bras and palpation.  She has not noticed any masses, skin changes, LAD or nipple discharge. She denies any trauma to this area. she had a MMG appt last week but had to R/S and now her appt is not until the end of the month. sheis not a caffeine drinker.  She had had a TLH with USO for pelvic pain and AUB. She has never been on HRT and denies  VB. She has a PGM and P uncle with stomach cancer and sees GI yearly.        OB History        2    Para   2    Term                AB        Living   2       SAB        IAB        Ectopic        Molar        Multiple        Live Births              Obstetric Comments   2              Menarche:11  Menopause:- TLH/USO for AUB and pain  HRT:none  Current contraception: post menopausal status and status post hysterectomy  History of abnormal Pap smear: no   History of abnormal mammogram: no  Family history of uterine, colon or ovarian cancer: no ( had PGM and P uncle with stomach cancer)  Family history of breast cancer: no  STD's: none   Last pap smear:3-4 years  Gardasil: missed  HARSH: none      Health Maintenance   Topic Date Due   • Annual Gynecologic Pelvic and Breast Exam  Never done   • HEPATITIS C SCREENING  Never done   • COVID-19 Vaccine (4 - Booster for Moderna series) 2021   • MAMMOGRAM  2022   • PAP SMEAR  2023 (Originally 7/10/2017)   • ANNUAL PHYSICAL  2024   • COLORECTAL CANCER SCREENING  2029   • TDAP/TD VACCINES (2 - Td or Tdap) 2030   • INFLUENZA VACCINE  Completed   • ZOSTER VACCINE  Completed   • Pneumococcal " Vaccine 0-64  Aged Out       Past Medical History:   Diagnosis Date   • GERD (gastroesophageal reflux disease)    • Ovarian cyst 2001    One ovary removed   • Rh incompatibility 1992   • Spinal headache 1992    Dr tried to give epidural at birth of my first child, but unable to.   • Varicella 1974       Past Surgical History:   Procedure Laterality Date   • CHOLECYSTECTOMY     • COLONOSCOPY  05/01/2019    IH, otherwise normal   • COLONOSCOPY     • COLONOSCOPY  05/01/2019    [facility], normal ileum, no specimens collected   • COLONOSCOPY W/ BIOPSIES  08/17/2010    ih, path benign   • ENDOSCOPY  05/01/2019    normal   • ENDOSCOPY  05/01/2019    normal exam, path: mild reactive gastropathy   • HYSTERECTOMY     • LAPAROSCOPIC ASSISTED VAGINAL HYSTERECTOMY SALPINGO OOPHORECTOMY  2002    I still have one ovary   • LAPAROSCOPIC CHOLECYSTECTOMY  2010   • UPPER GASTROINTESTINAL ENDOSCOPY  08/17/2010    acute gastritis, path mild chronic gastritis   • WISDOM TOOTH EXTRACTION  1986         Current Outpatient Medications:   •  lansoprazole (PREVACID) 30 MG capsule, Take 1 capsule by mouth 2 (Two) Times a Day., Disp: 180 capsule, Rfl: 3  •  COLLAGEN PO, Take  by mouth Daily., Disp: , Rfl:   •  doxylamine (UNISOM) 25 MG tablet, Take 25 mg by mouth At Night As Needed for Sleep (1/2 tab qhs)., Disp: , Rfl:   •  VITAMIN D, CHOLECALCIFEROL, PO, Take  by mouth Daily., Disp: , Rfl:     Allergies   Allergen Reactions   • Penicillins Rash   • Sulfa Antibiotics Rash       Social History     Tobacco Use   • Smoking status: Never   • Smokeless tobacco: Never   Vaping Use   • Vaping Use: Never used   Substance Use Topics   • Alcohol use: No   • Drug use: No       Family History   Problem Relation Age of Onset   • Kidney cancer Father    • Irritable bowel syndrome Father    • Lung disease Mother    • Hypertension Mother    • Stomach cancer Paternal Grandmother    • Stomach cancer Paternal Uncle    • Breast cancer Neg Hx    • Ovarian cancer Neg Hx   "  • Uterine cancer Neg Hx    • Colon cancer Neg Hx    • Pulmonary embolism Neg Hx    • Deep vein thrombosis Neg Hx        Review of Systems   Constitutional: Positive for activity change. Negative for appetite change, fatigue, fever and unexpected weight change.   Eyes: Negative for photophobia and visual disturbance.   Respiratory: Negative for cough and shortness of breath.    Cardiovascular: Negative for chest pain and palpitations.   Gastrointestinal: Positive for abdominal pain (GERD). Negative for abdominal distention, constipation, diarrhea and nausea.   Endocrine: Negative for cold intolerance and heat intolerance.   Genitourinary: Negative for dyspareunia, dysuria, menstrual problem, pelvic pain, vaginal bleeding and vaginal discharge.   Musculoskeletal: Negative for back pain.        R breast pain/axillary fullness   Skin: Negative for color change and rash.   Neurological: Negative for headaches.   Hematological: Negative for adenopathy. Does not bruise/bleed easily.   Psychiatric/Behavioral: Negative for dysphoric mood. The patient is not nervous/anxious.        /72   Ht 162.6 cm (64\")   Wt 57.2 kg (126 lb)   Breastfeeding No   BMI 21.63 kg/m²     Physical Exam  Vitals and nursing note reviewed. Exam conducted with a chaperone present.   Constitutional:       Appearance: Normal appearance. She is well-developed and normal weight.   HENT:      Head: Normocephalic and atraumatic.   Eyes:      General: No scleral icterus.     Conjunctiva/sclera: Conjunctivae normal.   Neck:      Thyroid: No thyromegaly.   Cardiovascular:      Rate and Rhythm: Normal rate and regular rhythm.   Pulmonary:      Effort: Pulmonary effort is normal.      Breath sounds: Normal breath sounds.   Chest:   Breasts:     Right: Swelling and tenderness present. No bleeding, inverted nipple, mass, nipple discharge or skin change.      Left: No inverted nipple, mass, nipple discharge, skin change or tenderness.       Abdominal: "      General: There is no distension.      Palpations: Abdomen is soft. There is no mass.      Tenderness: There is no abdominal tenderness. There is no guarding or rebound.      Hernia: No hernia is present.   Genitourinary:     Exam position: Supine.      Labia:         Right: No rash, tenderness or lesion.         Left: No rash, tenderness or lesion.       Urethra: No prolapse, urethral pain, urethral swelling or urethral lesion.      Vagina: No signs of injury and foreign body. No vaginal discharge, erythema, tenderness or bleeding.      Adnexa:         Right: No mass, tenderness or fullness.          Left: No mass, tenderness or fullness.        Comments: Uterus and cervix absent  Smooth cuff  Musculoskeletal:      Cervical back: Neck supple.   Skin:     General: Skin is warm and dry.   Neurological:      Mental Status: She is alert and oriented to person, place, and time.   Psychiatric:         Mood and Affect: Mood normal.         Behavior: Behavior normal.         Thought Content: Thought content normal.         Judgment: Judgment normal.            Assessment/Plan    1) GYN HM: pap/HPV  SBE demonstrated and encouraged.  2) STD screening: declines Condoms encouraged.  3) Bone health - Weight bearing exercise, dietary calcium recommendations and vitamin D reviewed.   4) Diet and Exercise discussed  5) Smoking Status: No  6) Social: no issues  7)MMG: UTD 9/2020- B1  8) DEXA-due, will schedule  9)C scope-UTD 5/2019- repeat in 10 years.    10) R mastodynia/axillary fullness- pt already has B DX MMG and US R breast scheduled. We will call radiology and see if it can be moved up. Enc Vit E 400 units a day. RTO in 4-6 weeks for recheck and if still bothersome will refer to breast specialist.   11) I saw the patient with a face mask, gloves and eye protection  The patient herself was masked.  Social distancing was observed as appropriate.  12) Follow up 4-6 weeks for breast check and 1 year annual  13)  I spent > 20  minutes on the separately reported service of Mastodynia. This time is not included in the time used to support the annual E/M service also reported today.         Diagnoses and all orders for this visit:    1. Pap smear, low-risk (Primary)  -     Cancel: POC Urinalysis Dipstick  -     IGP, Apt HPV,rfx 16 / 18,45    2. Routine gynecological examination  -     Cancel: POC Urinalysis Dipstick  -     IGP, Apt HPV,rfx 16 / 18,45    3. Special screening examination for human papillomavirus (HPV)  -     Cancel: POC Urinalysis Dipstick  -     IGP, Apt HPV,rfx 16 / 18,45    4. Encounter for screening mammogram for malignant neoplasm of breast    5. Screening for osteoporosis  -     DEXA Bone Density Axial; Future    6. Mastodynia        Isabel Mcnamara MD  01/11/2023    12:41 EST

## 2023-01-12 NOTE — PROGRESS NOTES
Cat scan normal  Follow up with BG already scheduled to discuss further testing and treatment Refill Routing Note   Medication(s) are not appropriate for processing by [facility] for the following reason(s):      - Required laboratory values are outdated    [facility] action(s):  Defer Medication-related problems identified:   Requires appointment  Requires labs        Medication reconciliation completed: No     Appointments  past 12m or future 3m with PCP    Date Provider   Last Visit   11/10/2021 Kirby Moses MD   Next Visit   Visit date not found Kirby Moses MD   ED visits in past 90 days: 0        Note composed:8:45 AM 01/12/2023

## 2023-01-13 LAB
CYTOLOGIST CVX/VAG CYTO: NORMAL
CYTOLOGY CVX/VAG DOC CYTO: NORMAL
CYTOLOGY CVX/VAG DOC THIN PREP: NORMAL
DX ICD CODE: NORMAL
HIV 1 & 2 AB SER-IMP: NORMAL
HPV I/H RISK 4 DNA CVX QL PROBE+SIG AMP: NEGATIVE
OTHER STN SPEC: NORMAL
STAT OF ADQ CVX/VAG CYTO-IMP: NORMAL

## 2023-01-18 ENCOUNTER — TELEPHONE (OUTPATIENT)
Dept: FAMILY MEDICINE CLINIC | Facility: CLINIC | Age: 58
End: 2023-01-18
Payer: COMMERCIAL

## 2023-01-18 NOTE — TELEPHONE ENCOUNTER
Left vm to call back       ----- Message from Elmira Frey MD sent at 1/18/2023  6:50 AM EST -----  Neg mammo and u/s   routine f/u in 1 year for MMG.  Continue monthly SBE.  If pain in right upper breast/ axilla continues, recommend that she f/u with our breast surgery department.

## 2023-01-20 ENCOUNTER — HOSPITAL ENCOUNTER (OUTPATIENT)
Dept: BONE DENSITY | Facility: HOSPITAL | Age: 58
Discharge: HOME | End: 2023-01-20
Admitting: OBSTETRICS & GYNECOLOGY
Payer: COMMERCIAL

## 2023-01-20 DIAGNOSIS — Z13.820 SCREENING FOR OSTEOPOROSIS: ICD-10-CM

## 2023-01-20 PROCEDURE — 77080 DXA BONE DENSITY AXIAL: CPT

## 2023-01-24 DIAGNOSIS — K21.9 GASTROESOPHAGEAL REFLUX DISEASE WITHOUT ESOPHAGITIS: Primary | ICD-10-CM

## 2023-01-31 ENCOUNTER — TELEPHONE (OUTPATIENT)
Dept: GASTROENTEROLOGY | Facility: CLINIC | Age: 58
End: 2023-01-31

## 2023-01-31 NOTE — TELEPHONE ENCOUNTER
Caller: Leandra Corona    Relationship to patient: Self    Best call back number: [phone]    Chief complaint: K21.9 (ICD-10-CM) - Gastroesophageal reflux disease without esophagitis    Type of visit: NEW PATIENT    Requested date: NEXT AVAILABLE    Additional notes: PT IS CURRENTLY WITH DR. DOSS AT [facility] BUT WANTS TO TRANSFER CARE TO DR. VALENCIA BECAUSE HE IS CLOSER TO HER.     REFERRAL IN CHART 01/24/23

## 2023-02-22 ENCOUNTER — OFFICE VISIT (OUTPATIENT)
Dept: OBSTETRICS AND GYNECOLOGY | Facility: CLINIC | Age: 58
End: 2023-02-22
Payer: COMMERCIAL

## 2023-02-22 VITALS
HEIGHT: 64 IN | BODY MASS INDEX: 21.85 KG/M2 | WEIGHT: 128 LBS | SYSTOLIC BLOOD PRESSURE: 136 MMHG | DIASTOLIC BLOOD PRESSURE: 84 MMHG

## 2023-02-22 DIAGNOSIS — N64.4 MASTODYNIA: Primary | ICD-10-CM

## 2023-02-22 DIAGNOSIS — M85.80 OSTEOPENIA, SENILE: ICD-10-CM

## 2023-02-22 PROCEDURE — 99213 OFFICE O/P EST LOW 20 MIN: CPT | Performed by: OBSTETRICS & GYNECOLOGY

## 2023-02-22 RX ORDER — MULTIVIT WITH MINERALS/LUTEIN
TABLET ORAL
COMMUNITY
Start: 2023-01-09

## 2023-02-22 NOTE — PROGRESS NOTES
"Leandra Corona is a 57 y.o. patient who presents for follow up of   Chief Complaint   Patient presents with   • RECHECK BREAST       56 yo est pt here for recheck of L breast. She was seen as a new patient in 1/2023 and was c/o R breast pain. She had a MMG and US on 1/17/2023 that was B1 and mention was made of a normal appearing lymph node in the area of tenderness. She says that her R breast feels better. She has been taking vit E and it has helped. She also had a DEXA that shows osteopenia and she is on Vit D and we discussed ways to improve dietary calcium.       The following portions of the patient's history were reviewed and updated as appropriate: allergies, current medications and problem list.    Review of Systems   Constitutional: Negative for activity change.   Musculoskeletal: Negative for arthralgias, back pain and myalgias.        R breast is feeling better.        /84   Ht 162.6 cm (64\")   Wt 58.1 kg (128 lb)   BMI 21.97 kg/m²     Physical Exam  Vitals and nursing note reviewed. Exam conducted with a chaperone present.   Constitutional:       Appearance: Normal appearance. She is well-developed.   HENT:      Head: Normocephalic and atraumatic.   Neck:      Thyroid: No thyromegaly.   Chest:   Breasts:     Right: No swelling, bleeding, inverted nipple, mass, nipple discharge, skin change or tenderness.      Left: No swelling, bleeding, inverted nipple, mass, nipple discharge, skin change or tenderness.   Abdominal:      General: There is no distension.      Palpations: Abdomen is soft. There is no mass.      Tenderness: There is no abdominal tenderness. There is no guarding or rebound.      Hernia: No hernia is present.   Lymphadenopathy:      Upper Body:      Right upper body: No supraclavicular or axillary adenopathy.      Left upper body: No supraclavicular or axillary adenopathy.   Skin:     General: Skin is warm and dry.   Neurological:      Mental Status: She is alert and oriented " to person, place, and time.   Psychiatric:         Mood and Affect: Mood normal.         Behavior: Behavior normal.         Thought Content: Thought content normal.         Judgment: Judgment normal.         A/P:  1. R mastodynia- MMG and US UTD 1/2023 B1. Pain has improved. Cont vit E. Call for any changes in symptoms.  2. Osteopenia- DEXA shows osteopenia, however, she still does not meet the criteria for treatment with osteoporosis medication. Rec daily calcium and vit D intake along with weight bearing exercises. Repeat DEXA in 2-3 years.   3. RHM- UTD annual 1/2023 normal pap/HPV.   4. RTO 1/2024 annual and/or prn.      Assessment & Plan   Diagnoses and all orders for this visit:    1. Mastodynia (Primary)    2. Osteopenia, senile                 No follow-ups on file.      Isabel Mcnamara MD    2/22/2023  21:33 EST

## 2023-09-20 ENCOUNTER — TELEPHONE (OUTPATIENT)
Dept: FAMILY MEDICINE CLINIC | Facility: CLINIC | Age: 58
End: 2023-09-20

## 2023-09-20 NOTE — TELEPHONE ENCOUNTER
Caller: Leandra Corona    Relationship: Self    Best call back number: [phone]     What medication are you requesting: FAMOTIDINE 40 MG    If a prescription is needed, what is your preferred pharmacy and phone number: [id number] - [place], [place] - [address] - [phone]  - [phone] FX     Additional notes: PATIENT STATES DR TORRES PUT HER ON THIS MEDICATION AND SHE IS ALMOST OUT, NOT ON MED LIST. PLEASE ADVISE.

## 2023-09-22 RX ORDER — FAMOTIDINE 40 MG/1
40 TABLET, FILM COATED ORAL DAILY
Qty: 90 TABLET | Refills: 1 | Status: SHIPPED | OUTPATIENT
Start: 2023-09-22

## 2023-09-22 NOTE — TELEPHONE ENCOUNTER
Leandra called back to speak to Jessica pt stated she takes famotidine (Pepcid) 40 MG tablet once a day. Pt would like a call back if any additional questions

## 2023-09-22 NOTE — TELEPHONE ENCOUNTER
Called patient back but was not able to speak with her. Left detailed VM asking Frey questions and asked her to call this nurse back.

## 2023-12-19 ENCOUNTER — TELEPHONE (OUTPATIENT)
Dept: FAMILY MEDICINE CLINIC | Facility: CLINIC | Age: 58
End: 2023-12-19
Payer: COMMERCIAL

## 2023-12-19 DIAGNOSIS — E55.9 VITAMIN D DEFICIENCY: ICD-10-CM

## 2023-12-19 DIAGNOSIS — Z00.00 WELL ADULT EXAM: Primary | ICD-10-CM

## 2023-12-19 NOTE — TELEPHONE ENCOUNTER
Caller: Leandra Corona    Relationship: Self    Best call back number: [phone]    What orders are you requesting (i.e. lab or imaging): LABS    In what timeframe would the patient need to come in: PRIOR TO VISIT ON 01/10/24    Where will you receive your lab/imaging services: IN OFFICE    Additional notes: PLEASE CALL TO SCHEDULE.

## 2023-12-22 ENCOUNTER — LAB (OUTPATIENT)
Dept: LAB | Facility: HOSPITAL | Age: 58
End: 2023-12-22
Payer: COMMERCIAL

## 2023-12-22 PROCEDURE — 80050 GENERAL HEALTH PANEL: CPT | Performed by: INTERNAL MEDICINE

## 2023-12-22 PROCEDURE — 80061 LIPID PANEL: CPT | Performed by: INTERNAL MEDICINE

## 2023-12-22 PROCEDURE — 86803 HEPATITIS C AB TEST: CPT | Performed by: INTERNAL MEDICINE

## 2023-12-22 PROCEDURE — 82306 VITAMIN D 25 HYDROXY: CPT | Performed by: INTERNAL MEDICINE

## 2023-12-22 PROCEDURE — 84439 ASSAY OF FREE THYROXINE: CPT | Performed by: INTERNAL MEDICINE

## 2024-01-10 ENCOUNTER — OFFICE VISIT (OUTPATIENT)
Dept: FAMILY MEDICINE CLINIC | Facility: CLINIC | Age: 59
End: 2024-01-10
Payer: COMMERCIAL

## 2024-01-10 VITALS
DIASTOLIC BLOOD PRESSURE: 76 MMHG | HEIGHT: 64 IN | OXYGEN SATURATION: 95 % | WEIGHT: 120 LBS | BODY MASS INDEX: 20.49 KG/M2 | HEART RATE: 96 BPM | SYSTOLIC BLOOD PRESSURE: 136 MMHG | TEMPERATURE: 97.7 F

## 2024-01-10 DIAGNOSIS — L98.9 SKIN LESION: ICD-10-CM

## 2024-01-10 DIAGNOSIS — Z00.00 WELL ADULT EXAM: Primary | ICD-10-CM

## 2024-01-10 PROCEDURE — 99396 PREV VISIT EST AGE 40-64: CPT | Performed by: INTERNAL MEDICINE

## 2024-01-10 NOTE — PROGRESS NOTES
"Chief Complaint  Annual Exam (Requesting a different/new gastro referral (prefers in same building as PCP))    Subjective        Leandra Corona presents to [facility]  History of Present Illness  Here for CPE.  Was on prevacid for GERD and with her osteopenia that was shown on dexa, she stopped prevacid and is now taking famotidine 40 mg qd.  She is also taking a gut supplement otc with rachele.  Sees Dr. Mcnamara 1/24/24 for her pap smear and MMG  She is exercising regularly and eating less quantities overall and less sweets and her weight is stable at 120 pounds  no dysphagia.  No constipation and no diarrhea.  No n/v.  Good appetite.  CN and EGD 2019  repeat in 2029  Objective   Vital Signs:  /76   Pulse 96   Temp 97.7 °F (36.5 °C) (Temporal)   Ht 162.6 cm (64.02\")   Wt 54.4 kg (120 lb)   SpO2 95%   BMI 20.59 kg/m²   Estimated body mass index is 20.59 kg/m² as calculated from the following:    Height as of this encounter: 162.6 cm (64.02\").    Weight as of this encounter: 54.4 kg (120 lb).       BMI is within normal parameters. No other follow-up for BMI required.    Vitamin D,25-Hydroxy (12/22/2023 09:00)  Hepatitis C Antibody (12/22/2023 09:00)  TSH (12/22/2023 09:00)  T4, Free (12/22/2023 09:00)  CBC & Differential (12/22/2023 09:00)  Lipid Panel (12/22/2023 09:00)  Comprehensive Metabolic Panel (12/22/2023 09:00)  IGP, Apt HPV,rfx 16 / 18,45 (01/11/2023 10:27)     Physical Exam  Vitals and nursing note reviewed.   Constitutional:       Appearance: Normal appearance. She is well-developed and normal weight.   HENT:      Head: Normocephalic and atraumatic.      Right Ear: Tympanic membrane, ear canal and external ear normal.      Left Ear: Tympanic membrane, ear canal and external ear normal.   Eyes:      Extraocular Movements: Extraocular movements intact.      Conjunctiva/sclera: Conjunctivae normal.   Neck:      Vascular: No carotid bruit.   Cardiovascular:      " Rate and Rhythm: Normal rate and regular rhythm.      Heart sounds: Normal heart sounds.      Comments: No bruits  Pulmonary:      Effort: Pulmonary effort is normal. No respiratory distress.      Breath sounds: Normal breath sounds. No stridor. No wheezing, rhonchi or rales.   Chest:      Chest wall: No tenderness.   Abdominal:      General: Abdomen is flat. Bowel sounds are normal. There is no distension.      Palpations: Abdomen is soft. There is no mass.      Tenderness: There is no abdominal tenderness. There is no guarding or rebound.      Hernia: No hernia is present.   Musculoskeletal:      Cervical back: Neck supple.      Right lower leg: No edema.      Left lower leg: No edema.   Lymphadenopathy:      Cervical: No cervical adenopathy.   Skin:     General: Skin is warm.   Neurological:      General: No focal deficit present.      Mental Status: She is alert and oriented to person, place, and time. Mental status is at baseline.   Psychiatric:         Mood and Affect: Mood normal.         Behavior: Behavior normal.         Thought Content: Thought content normal.         Judgment: Judgment normal.        Result Review :          DEXA Bone Density Axial (01/20/2023 13:10)          Assessment and Plan   Diagnoses and all orders for this visit:    1. Well adult exam (Primary)  -     CBC & Differential; Future  -     Comprehensive Metabolic Panel; Future  -     Lipid Panel With LDL / HDL Ratio; Future  -     TSH; Future  -     T4, Free; Future  -     Urinalysis With Culture If Indicated -; Future    2. Skin lesion  -     Ambulatory Referral to Dermatology    Dermatology referral to Dr. Alford for skin check due to fhx of skin cancers and fair complexion.  Her derm retired last year  CN in 2029  MMG and pap with Dr. Mcnamara --appt 1/2024  Fasting labs reviewed and look excellent.  Reordered for 2025  Recommend continued use of famotidine 40 mg qhs and add in probiotic and gut health supplement from [facility].    Breast pain has resolved with vit E.  We did discuss its blood thinning qualities  Vaccinations reviewed.    Preventative counseling provided  Osteopenia--weight bearing exercise and calcium rich foods/supplements and D3         Follow Up   Return in about 1 year (around 1/10/2025) for Annual physical.  Patient was given instructions and counseling regarding her condition or for health maintenance advice. Please see specific information pulled into the AVS if appropriate.

## 2024-01-24 ENCOUNTER — OFFICE VISIT (OUTPATIENT)
Dept: OBSTETRICS AND GYNECOLOGY | Facility: CLINIC | Age: 59
End: 2024-01-24
Payer: COMMERCIAL

## 2024-01-24 VITALS
HEIGHT: 64 IN | BODY MASS INDEX: 20.42 KG/M2 | DIASTOLIC BLOOD PRESSURE: 72 MMHG | WEIGHT: 119.6 LBS | SYSTOLIC BLOOD PRESSURE: 112 MMHG

## 2024-01-24 DIAGNOSIS — Z12.31 ENCOUNTER FOR SCREENING MAMMOGRAM FOR MALIGNANT NEOPLASM OF BREAST: ICD-10-CM

## 2024-01-24 DIAGNOSIS — Z01.419 ROUTINE GYNECOLOGICAL EXAMINATION: Primary | ICD-10-CM

## 2024-01-24 DIAGNOSIS — M85.80 OSTEOPENIA, SENILE: ICD-10-CM

## 2024-01-24 PROBLEM — Z00.00 WELL ADULT EXAM: Status: RESOLVED | Noted: 2021-12-14 | Resolved: 2024-01-24

## 2024-01-24 LAB
BILIRUB BLD-MCNC: NEGATIVE MG/DL
CLARITY, POC: CLEAR
COLOR UR: YELLOW
GLUCOSE UR STRIP-MCNC: NEGATIVE MG/DL
KETONES UR QL: NEGATIVE
LEUKOCYTE EST, POC: NEGATIVE
NITRITE UR-MCNC: NEGATIVE MG/ML
PH UR: 5 [PH] (ref 5–8)
PROT UR STRIP-MCNC: NEGATIVE MG/DL
RBC # UR STRIP: NEGATIVE /UL
SP GR UR: 1 (ref 1–1.03)
UROBILINOGEN UR QL: NORMAL

## 2024-01-24 NOTE — PROGRESS NOTES
GYN Annual Exam     CC- Here for annual exam.     Leandra Corona is a 58 y.o. female est pt who presents for annual well woman exam.  She had had a TLH with USO for pelvic pain and AUB. She has never been on HRT and denies  VB. She has a PGM and P uncle with stomach cancer and sees GI yearly. She is interested in seeing a new provider at  and has some names. She stopped her PPI and is doing okay. Her breast pain has resolved. She is going on a cruise to [place]. She has a lesion on her R cheek and I have advised that she see her derm to have this biopsied.       OB History          2    Para   2    Term                AB        Living   2         SAB        IAB        Ectopic        Molar        Multiple        Live Births              Obstetric Comments   2                Menarche:11  Menopause:- TLH/USO for AUB and pain  HRT:none  Current contraception: post menopausal status and status post hysterectomy  History of abnormal Pap smear: no   History of abnormal mammogram: no  Family history of uterine, colon or ovarian cancer: no ( had PGM and P uncle with stomach cancer)  Family history of breast cancer: no  STD's: none   Last pap smear: 2023- normal pap/HPV  Gardasil: missed  HARSH: none      Health Maintenance   Topic Date Due    Annual Gynecologic Pelvic and Breast Exam  2024    COVID-19 Vaccine (2023-24 season) 2024 (Originally 2023)    ANNUAL PHYSICAL  01/10/2025    MAMMOGRAM  2025    DXA SCAN  2025    PAP SMEAR  2026    COLORECTAL CANCER SCREENING  2029    TDAP/TD VACCINES (2 - Td or Tdap) 2030    HEPATITIS C SCREENING  Completed    INFLUENZA VACCINE  Completed    ZOSTER VACCINE  Completed    Pneumococcal Vaccine 0-64  Aged Out       Past Medical History:   Diagnosis Date    GERD (gastroesophageal reflux disease)     Osteopenia     Ovarian cyst     One ovary removed    Rh incompatibility     Scoliosis     Spinal headache  1992    Dr tried to give epidural at birth of my first child, but unable to.    Varicella 1974       Past Surgical History:   Procedure Laterality Date    CHOLECYSTECTOMY      COLONOSCOPY  05/01/2019    IH, otherwise normal    COLONOSCOPY      COLONOSCOPY  05/01/2019    NBIH, normal ileum, no specimens collected    COLONOSCOPY W/ BIOPSIES  08/17/2010    ih, path benign    ENDOSCOPY  05/01/2019    normal    ENDOSCOPY  05/01/2019    normal exam, path: mild reactive gastropathy    HYSTERECTOMY      LAPAROSCOPIC ASSISTED VAGINAL HYSTERECTOMY SALPINGO OOPHORECTOMY  2002    I still have one ovary    LAPAROSCOPIC CHOLECYSTECTOMY  2010    UPPER GASTROINTESTINAL ENDOSCOPY  08/17/2010    acute gastritis, path mild chronic gastritis    WISDOM TOOTH EXTRACTION  1986         Current Outpatient Medications:     COLLAGEN PO, Take  by mouth Daily., Disp: , Rfl:     doxylamine (UNISOM) 25 MG tablet, Take 1 tablet by mouth At Night As Needed for Sleep (1/2 tab qhs)., Disp: , Rfl:     famotidine (Pepcid) 40 MG tablet, Take 1 tablet by mouth Daily., Disp: 90 tablet, Rfl: 1    VITAMIN D, CHOLECALCIFEROL, PO, Take  by mouth Daily., Disp: , Rfl:     Vitamin E 450 MG (1000 UT) capsule, , Disp: , Rfl:     Allergies   Allergen Reactions    Penicillins Rash    Sulfa Antibiotics Rash       Social History     Tobacco Use    Smoking status: Never    Smokeless tobacco: Never   Vaping Use    Vaping Use: Never used   Substance Use Topics    Alcohol use: No    Drug use: No       Family History   Problem Relation Age of Onset    Kidney cancer Father     Irritable bowel syndrome Father     Cancer Father     Lung disease Mother     Hypertension Mother     Hyperlipidemia Mother     Stomach cancer Paternal Grandmother     Stomach cancer Paternal Uncle     Breast cancer Neg Hx     Ovarian cancer Neg Hx     Uterine cancer Neg Hx     Colon cancer Neg Hx     Pulmonary embolism Neg Hx     Deep vein thrombosis Neg Hx        Review of Systems   Constitutional:   "Negative for activity change, appetite change, fatigue, fever and unexpected weight change.   Eyes:  Negative for photophobia and visual disturbance.   Respiratory:  Negative for cough and shortness of breath.    Cardiovascular:  Negative for chest pain and palpitations.   Gastrointestinal:  Negative for abdominal distention, abdominal pain, constipation, diarrhea and nausea.   Endocrine: Negative for cold intolerance and heat intolerance.   Genitourinary:  Negative for dyspareunia, dysuria, menstrual problem, pelvic pain, vaginal bleeding, vaginal discharge and vaginal pain.   Musculoskeletal:  Negative for back pain.   Skin:  Negative for color change and rash.   Neurological:  Negative for headaches.   Hematological:  Negative for adenopathy. Does not bruise/bleed easily.   Psychiatric/Behavioral:  Negative for dysphoric mood. The patient is not nervous/anxious.        /72   Ht 162.6 cm (64\")   Wt 54.3 kg (119 lb 9.6 oz)   BMI 20.53 kg/m²     Physical Exam  Vitals and nursing note reviewed. Exam conducted with a chaperone present.   Constitutional:       Appearance: Normal appearance. She is well-developed and normal weight.   HENT:      Head: Normocephalic and atraumatic.   Eyes:      General: No scleral icterus.     Conjunctiva/sclera: Conjunctivae normal.   Neck:      Thyroid: No thyromegaly.   Cardiovascular:      Rate and Rhythm: Normal rate and regular rhythm.   Pulmonary:      Effort: Pulmonary effort is normal.      Breath sounds: Normal breath sounds.   Chest:   Breasts:     Right: No swelling, bleeding, inverted nipple, mass, nipple discharge, skin change or tenderness.      Left: No swelling, bleeding, inverted nipple, mass, nipple discharge, skin change or tenderness.   Abdominal:      General: There is no distension.      Palpations: Abdomen is soft. There is no mass.      Tenderness: There is no abdominal tenderness. There is no guarding or rebound.      Hernia: No hernia is present. "   Genitourinary:     Exam position: Supine.      Labia:         Right: No rash, tenderness or lesion.         Left: No rash, tenderness or lesion.       Urethra: No prolapse, urethral pain, urethral swelling or urethral lesion.      Vagina: No signs of injury and foreign body. No vaginal discharge, erythema, tenderness or bleeding.      Uterus: Absent.       Adnexa:         Right: No mass, tenderness or fullness.          Left: No mass, tenderness or fullness.        Comments: Uterus and cervix absent  Smooth cuff  Musculoskeletal:      Cervical back: Neck supple.   Skin:     General: Skin is warm and dry.          Neurological:      Mental Status: She is alert and oriented to person, place, and time.   Psychiatric:         Mood and Affect: Mood normal.         Behavior: Behavior normal.         Thought Content: Thought content normal.         Judgment: Judgment normal.            Assessment/Plan    1) GYN HM: normal pap/HPV 2023. SBE demonstrated and encouraged.  2) STD screening: declines Condoms encouraged.  3) Bone health - Weight bearing exercise, dietary calcium recommendations and vitamin D reviewed.   4) Diet and Exercise discussed  5) Smoking Status: No  6) Social: no issues  7)MM2023- B1. Schedule MMG now  8) DEXA- UTD 2023- osteopenia with LROF 7 & 0.8 %. Repeat DEXA in 2-3 years  9)C scope-UTD 2019- repeat in 10 years.   Advised pt to see GI provider to see when EGD/CS are due.   10) Parts of this document have been copied or forwarded from her previous visits and have been reviewed, updated and edited as indicated.   11) RTO 1 year annual and/or prn.           Diagnoses and all orders for this visit:    1. Routine gynecological examination (Primary)  -     POC Urinalysis Dipstick    2. Encounter for screening mammogram for malignant neoplasm of breast  -     Mammo Screening Digital Tomosynthesis Bilateral With CAD; Future    3. Osteopenia, senile        Isabel Mcnamara,  MD  01/24/2024    19:34 EST

## 2024-03-22 RX ORDER — FAMOTIDINE 40 MG/1
40 TABLET, FILM COATED ORAL DAILY
Qty: 90 TABLET | Refills: 1 | Status: SHIPPED | OUTPATIENT
Start: 2024-03-22

## 2024-06-19 ENCOUNTER — OFFICE VISIT (OUTPATIENT)
Dept: FAMILY MEDICINE CLINIC | Facility: CLINIC | Age: 59
End: 2024-06-19
Payer: COMMERCIAL

## 2024-06-19 VITALS
HEIGHT: 65 IN | OXYGEN SATURATION: 99 % | HEART RATE: 67 BPM | DIASTOLIC BLOOD PRESSURE: 63 MMHG | TEMPERATURE: 97.9 F | BODY MASS INDEX: 19.79 KG/M2 | SYSTOLIC BLOOD PRESSURE: 107 MMHG | WEIGHT: 118.8 LBS

## 2024-06-19 DIAGNOSIS — R22.42 SKIN LUMP OF LEG, LEFT: Primary | ICD-10-CM

## 2024-06-19 PROCEDURE — 99213 OFFICE O/P EST LOW 20 MIN: CPT | Performed by: INTERNAL MEDICINE

## 2024-06-19 NOTE — PROGRESS NOTES
"Chief Complaint  Office Visit  (Bump on left leg very painful. )    Subjective        Leandra Corona presents to [facility]  History of Present Illness  Patient noticed a very small lump on her lower left leg lateral to the fibula and the adjacent soft tissue.  The lump is very painful to touch.  It has not changed.  There is no skin abnormality.  No recent infection in her foot or her leg.  No recent cuts or injuries.  She noticed this area about a month ago.  It does not seem to change.  She can see a small lump and feel it best when she has her knee flexed/bent.  You can also see the lump best when she dorsiflexes her foot.Answers submitted by the patient for this visit:  Other (Submitted on 6/18/2024)  Please describe your symptoms.: A lump has come up on my shin. It is an internal lump, possibly in the muscle. , It is painful at times  Have you had these symptoms before?: No  How long have you been having these symptoms?: Greater than 2 weeks  Primary Reason for Visit (Submitted on 6/18/2024)  What is the primary reason for your visit?: Other    Objective   Vital Signs:  /63   Pulse 67   Temp 97.9 °F (36.6 °C)   Ht 163.8 cm (64.5\")   Wt 53.9 kg (118 lb 12.8 oz)   SpO2 99%   BMI 20.08 kg/m²   Estimated body mass index is 20.08 kg/m² as calculated from the following:    Height as of this encounter: 163.8 cm (64.5\").    Weight as of this encounter: 53.9 kg (118 lb 12.8 oz).       BMI is within normal parameters. No other follow-up for BMI required.      Physical Exam  Vitals and nursing note reviewed.   Constitutional:       Appearance: Normal appearance.   HENT:      Head: Normocephalic and atraumatic.   Musculoskeletal:         General: Tenderness (over \"lump\") present.      Right lower leg: No bony tenderness. No edema.      Left lower leg: Tenderness present. No deformity, lacerations or bony tenderness. No edema.        Legs:       Comments: 0.5 in x 0.5 in soft, " spongy oval mass--tender to touch but no discoloration  no rash. No injury.  ? Venule vs LN vs fatty deposit.     Skin:     Capillary Refill: Capillary refill takes less than 2 seconds.      Findings: No rash.   Neurological:      General: No focal deficit present.      Mental Status: She is alert and oriented to person, place, and time.   Psychiatric:         Mood and Affect: Mood normal.         Behavior: Behavior normal.         Thought Content: Thought content normal.         Judgment: Judgment normal.        Result Review :                     Assessment and Plan     Diagnoses and all orders for this visit:    1. Skin lump of leg, left (Primary)  -     US Nonvascular Extremity Limited; Future    DDX ? If could be venule since it is tender and adjacent to veins vs fatty deposit or LN.  Feels benign.  Will get u/s to determine cause.           Follow Up     No follow-ups on file.  Patient was given instructions and counseling regarding her condition or for health maintenance advice. Please see specific information pulled into the AVS if appropriate.

## 2024-06-25 ENCOUNTER — HOSPITAL ENCOUNTER (OUTPATIENT)
Dept: ULTRASOUND IMAGING | Facility: HOSPITAL | Age: 59
Discharge: HOME | End: 2024-06-25
Admitting: INTERNAL MEDICINE
Payer: COMMERCIAL

## 2024-06-25 DIAGNOSIS — R22.42 SKIN LUMP OF LEG, LEFT: ICD-10-CM

## 2024-06-25 PROCEDURE — 76882 US LMTD JT/FCL EVL NVASC XTR: CPT

## 2024-07-01 ENCOUNTER — TELEPHONE (OUTPATIENT)
Dept: FAMILY MEDICINE CLINIC | Facility: CLINIC | Age: 59
End: 2024-07-01
Payer: COMMERCIAL

## 2024-07-01 NOTE — TELEPHONE ENCOUNTER
Caller: Leandra Corona    Relationship: Self    Best call back number: [phone]     Caller requesting test results: PATIENT    What test was performed: ULTRASOUND    When was the test performed: 6/25/24

## 2024-07-02 DIAGNOSIS — R22.42 LEG MASS, LEFT: ICD-10-CM

## 2024-07-02 DIAGNOSIS — R22.42 SKIN LUMP OF LEG, LEFT: Primary | ICD-10-CM

## 2024-07-10 ENCOUNTER — OFFICE VISIT (OUTPATIENT)
Dept: ORTHOPEDIC SURGERY | Facility: CLINIC | Age: 59
End: 2024-07-10
Payer: COMMERCIAL

## 2024-07-10 VITALS — HEIGHT: 64 IN | WEIGHT: 119.2 LBS | TEMPERATURE: 98.5 F | BODY MASS INDEX: 20.35 KG/M2

## 2024-07-10 DIAGNOSIS — R22.42 MASS OF LEG, LEFT: ICD-10-CM

## 2024-07-10 DIAGNOSIS — R52 PAIN: Primary | ICD-10-CM

## 2024-07-10 NOTE — PROGRESS NOTES
New Patient Complaint      Patient: Leandra Corona  YOB: 1965 58 y.o. female  Medical Record Number: [id number]    Chief Complaints: Bump on my leg    History of Present Illness: Patient noticed a mass over the superolateral aspect of the mid third of her left lateral lower leg in May 2024.  She has had intermittent discomfort there but has not noted specifically any change in size nor any neuritic symptoms.  She saw her PCP and had an ultrasound which was equivocal.    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 Elmira Frey who has requested my opinion regarding etiology and treatment of this condition       HPI    Allergies:   Allergies   Allergen Reactions    Penicillins Rash    Sulfa Antibiotics Rash       Medications:   Current Outpatient Medications on File Prior to Visit   Medication Sig    COLLAGEN PO Take  by mouth Daily.    doxylamine (UNISOM) 25 MG tablet Take 1 tablet by mouth At Night As Needed for Sleep (1/2 tab qhs).    famotidine (Pepcid) 40 MG tablet Take 1 tablet by mouth Daily.    VITAMIN D, CHOLECALCIFEROL, PO Take  by mouth Daily.    Vitamin E 450 MG (1000 UT) capsule      No current facility-administered medications on file prior to visit.       Past Medical History:   Diagnosis Date    GERD (gastroesophageal reflux disease)     Osteopenia     Ovarian cyst 2001    One ovary removed    Rh incompatibility 1992    Scoliosis 2001    Spinal headache 1992    Dr tried to give epidural at birth of my first child, but unable to.    Varicella 1974     Past Surgical History:   Procedure Laterality Date    CHOLECYSTECTOMY      COLONOSCOPY  05/01/2019    IH, otherwise normal    COLONOSCOPY      COLONOSCOPY  05/01/2019    NBIH, normal ileum, no specimens collected    COLONOSCOPY W/ BIOPSIES  08/17/2010    ih, path benign    ENDOSCOPY  05/01/2019    normal    ENDOSCOPY  05/01/2019    normal exam, path: mild  "reactive gastropathy    HYSTERECTOMY      LAPAROSCOPIC ASSISTED VAGINAL HYSTERECTOMY SALPINGO OOPHORECTOMY  2002    I still have one ovary    LAPAROSCOPIC CHOLECYSTECTOMY  2010    UPPER GASTROINTESTINAL ENDOSCOPY  08/17/2010    acute gastritis, path mild chronic gastritis    WISDOM TOOTH EXTRACTION  1986     Social History     Occupational History    Not on file   Tobacco Use    Smoking status: Never     Passive exposure: Never    Smokeless tobacco: Never   Vaping Use    Vaping status: Never Used   Substance and Sexual Activity    Alcohol use: No    Drug use: No    Sexual activity: Yes     Partners: Male     Birth control/protection: Post-menopausal, Hysterectomy     Comment: TLH/USO for AUB and pain      Social History     Social History Narrative    Not on file     Family History   Problem Relation Age of Onset    Kidney cancer Father     Irritable bowel syndrome Father     Cancer Father     Lung disease Mother     Hypertension Mother     Hyperlipidemia Mother     Stomach cancer Paternal Grandmother     Stomach cancer Paternal Uncle     Breast cancer Neg Hx     Ovarian cancer Neg Hx     Uterine cancer Neg Hx     Colon cancer Neg Hx     Pulmonary embolism Neg Hx     Deep vein thrombosis Neg Hx        Review of Systems: 14 point review of systems performed, positive pertinent findings identified in HPI. All remaining systems negative     Review of Systems      Physical Exam:   Vitals:    07/10/24 0823   Temp: 98.5 °F (36.9 °C)   TempSrc: Temporal   Weight: 54.1 kg (119 lb 3.2 oz)   Height: 162.6 cm (64\")   PainSc:   5   PainLoc: Leg     Physical Exam   Constitutional: pleasant, well developed   Eyes: sclera non icteric  Hearing : adequate for exam  Cardiovascular: palpable pulses in left foot, left calf/ thigh NT without sign of DVT  Respiratoy: breathing unlabored   Neurological: grossly sensate to LT throughout left LE  Psychiatric: oriented with normal mood and affect.   Lymphatic: No palpable popliteal " lymphadenopathy left LE  Skin: intact throughout left leg/foot  Musculoskeletal: On the hand there is a small area of fullness over the superolateral aspect of the upper third of the left lower leg with slight tenderness palpation this measures about 6 mm x 4 mm.  Overlying skin is intact.  There was no Tinel's over this area and she had good dorsiflexion plantarflexion of the ankle and toes without pain to this area.  Physical Exam  Ortho Exam    Radiology:    2 views of the left tib-fib ordered evaluate mass reviewed and no prior x-ray images available for comparison I do not see any obvious soft tissue or bony lesions.  No obvious fractures or malalignment.  No    Ultrasound report and images reviewed from 6/25/2024 the left leg which described a 6 x 4 x 4 mm hypoechoic nodule along the deep margin of the subcutaneous fat at the site of clinical interest containing internal vascularity and appears to be solid and extends along the superficial fascial layer of the underlying musculature.    Assessment/Plan: Left leg mass of unclear etiology.    We discussed treatment going forward and this is unlikely to be a neuroma given her lack of neuritic symptoms that may be a dermatofibroma or other soft tissue mass.    Will go ahead and get an MRI with IV contrast of her left tib-fib for further delineation of this and we will see her back in about 3 weeks to review results and determine treatment course going forward.

## 2024-07-18 ENCOUNTER — HOSPITAL ENCOUNTER (OUTPATIENT)
Dept: MRI IMAGING | Facility: HOSPITAL | Age: 59
Discharge: HOME | End: 2024-07-18
Admitting: ORTHOPAEDIC SURGERY
Payer: COMMERCIAL

## 2024-07-18 DIAGNOSIS — R22.42 MASS OF LEG, LEFT: ICD-10-CM

## 2024-07-18 LAB — CREAT BLDA-MCNC: 1.1 MG/DL (ref 0.6–1.3)

## 2024-07-18 PROCEDURE — 82565 ASSAY OF CREATININE: CPT

## 2024-07-18 PROCEDURE — 73720 MRI LWR EXTREMITY W/O&W/DYE: CPT

## 2024-07-18 PROCEDURE — A9577 INJ MULTIHANCE: HCPCS | Performed by: ORTHOPAEDIC SURGERY

## 2024-07-18 PROCEDURE — 0 GADOBENATE DIMEGLUMINE 529 MG/ML SOLUTION: Performed by: ORTHOPAEDIC SURGERY

## 2024-07-18 RX ADMIN — GADOBENATE DIMEGLUMINE 10 ML: 529 INJECTION, SOLUTION INTRAVENOUS at 15:11

## 2024-07-29 ENCOUNTER — OFFICE VISIT (OUTPATIENT)
Dept: ORTHOPEDIC SURGERY | Facility: CLINIC | Age: 59
End: 2024-07-29
Payer: COMMERCIAL

## 2024-07-29 VITALS — HEIGHT: 65 IN | WEIGHT: 119.8 LBS | BODY MASS INDEX: 19.96 KG/M2 | TEMPERATURE: 98 F

## 2024-07-29 DIAGNOSIS — R22.42 MASS OF LEG, LEFT: Primary | ICD-10-CM

## 2024-07-29 PROCEDURE — 99214 OFFICE O/P EST MOD 30 MIN: CPT | Performed by: ORTHOPAEDIC SURGERY

## 2024-07-29 NOTE — PROGRESS NOTES
"Leg follow Up      Patient: Leandra Corona    YOB: 1965 59 y.o. female    Chief Complaints: Ankle pain    History of Present Illness:Patient was seen on 7/10/2024 reporting that she noticed noticed a mass over the superolateral aspect of the mid third of her left lateral lower leg in May 2024.  She had had intermittent discomfort there but has not noted specifically any change in size nor any neuritic symptoms.  She had seen her PCP and had an ultrasound which was equivocal.     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    She was sent for MRI for further evaluation of the mass    Patient is seen back today with intermittent pain over the anterolateral aspect of the leg on the left in the area of the mass.  She has not had any numbness or tingling.  HPI    ROS: Leg pain  Past Medical History:   Diagnosis Date    GERD (gastroesophageal reflux disease)     Lumbosacral disc disease 2001    Herniated, ruptured discs    Osteopenia     Ovarian cyst 2001    One ovary removed    Rh incompatibility 1992    Scoliosis 2001    Spinal headache 1992    Dr tried to give epidural at birth of my first child, but unable to.    Varicella 1974       Physical Exam:   Vitals:    07/29/24 1450   Temp: 98 °F (36.7 °C)   Weight: 54.3 kg (119 lb 12.8 oz)   Height: 163.8 cm (64.5\")   PainSc:   1   PainLoc: Leg     Well developed with normal mood.  On exam she has a palpable mass over the anterolateral aspect of the left midportion of the lower leg is somewhat tender to palpation this measures about 8 mm x 8 mm and overlying skin is intact it is not mobile.  There is tenderness to palpation with percussion over this area but no Tinel's or elicitation of neuritic symptoms distally.  She did not demonstrate any gross motor weakness.      Radiology: MRI films and report of the left tib-fib dated 7/18/2024 with gel marker show [place] gel marker and area of enhancing lesion measuring 8 x 8 x 5 mm.  " Lesion appears to be fascial base along the lateral margin of the extensor digitorum longus muscle without evidence of deep tissue extension or bone involvement.      Assessment/Plan: Left leg mass    We discussed continued nonoperative versus operative treatment options and although no guarantees could be given mutual decision made to proceed with operative treatment.  We discussed possible ultrasound-guided tissue sampling but this mass is so small is difficult to say if this would yield good results and could still require excision given that it is painful.    I reviewed the operative procedure and postoperative course with her and her  which it was clear understanding as well as associated risk benefits potential outcomes and complications of which there was clear understanding and can include but not limited to heart attack stroke death pneumonia infection bleeding damage to blood vessels nerves or tendons blood clots pulmonary embolism persistent or worsening pain stiffness need for subsequent surgery and failure to return to presurgery and precondition levels of activity including even catastrophic complications that could result in long-term wound care or plastic reconstruction or loss of the leg.    All of their questions were answered to her full satisfaction and we will plan to proceed with this on outpatient basis at a mutually convenient time.  She was encouraged to call if she has any questions prior to surgery.

## 2024-08-02 PROBLEM — R22.42 MASS OF LEG, LEFT: Status: ACTIVE | Noted: 2024-07-29

## 2024-08-23 ENCOUNTER — PRE-ADMISSION TESTING (OUTPATIENT)
Dept: PREADMISSION TESTING | Facility: HOSPITAL | Age: 59
End: 2024-08-23
Payer: COMMERCIAL

## 2024-08-23 VITALS
SYSTOLIC BLOOD PRESSURE: 120 MMHG | TEMPERATURE: 97.9 F | DIASTOLIC BLOOD PRESSURE: 63 MMHG | WEIGHT: 120 LBS | HEIGHT: 65 IN | OXYGEN SATURATION: 100 % | RESPIRATION RATE: 16 BRPM | HEART RATE: 78 BPM | BODY MASS INDEX: 19.99 KG/M2

## 2024-08-23 LAB
ANION GAP SERPL CALCULATED.3IONS-SCNC: 6 MMOL/L (ref 5–15)
BUN SERPL-MCNC: 16 MG/DL (ref 8–23)
BUN/CREAT SERPL: 21.9 (ref 7–25)
CALCIUM SPEC-SCNC: 9.1 MG/DL (ref 8.6–10.5)
CHLORIDE SERPL-SCNC: 107 MMOL/L (ref 98–107)
CO2 SERPL-SCNC: 28 MMOL/L (ref 22–29)
CREAT SERPL-MCNC: 0.73 MG/DL (ref 0.57–1)
DEPRECATED RDW RBC AUTO: 43.2 FL (ref 37–54)
EGFRCR SERPLBLD CKD-EPI 2021: 94.3 ML/MIN/1.73
ERYTHROCYTE [DISTWIDTH] IN BLOOD BY AUTOMATED COUNT: 12.9 % (ref 12.3–15.4)
GLUCOSE SERPL-MCNC: 60 MG/DL (ref 65–99)
HCT VFR BLD AUTO: 41.1 % (ref 34–46.6)
HGB BLD-MCNC: 13.5 G/DL (ref 12–15.9)
MCH RBC QN AUTO: 30.5 PG (ref 26.6–33)
MCHC RBC AUTO-ENTMCNC: 32.8 G/DL (ref 31.5–35.7)
MCV RBC AUTO: 92.8 FL (ref 79–97)
PLATELET # BLD AUTO: 195 10*3/MM3 (ref 140–450)
PMV BLD AUTO: 9.1 FL (ref 6–12)
POTASSIUM SERPL-SCNC: 3.8 MMOL/L (ref 3.5–5.2)
RBC # BLD AUTO: 4.43 10*6/MM3 (ref 3.77–5.28)
SODIUM SERPL-SCNC: 141 MMOL/L (ref 136–145)
WBC NRBC COR # BLD AUTO: 5.61 10*3/MM3 (ref 3.4–10.8)

## 2024-08-23 PROCEDURE — 80048 BASIC METABOLIC PNL TOTAL CA: CPT

## 2024-08-23 PROCEDURE — 36415 COLL VENOUS BLD VENIPUNCTURE: CPT

## 2024-08-23 PROCEDURE — 85027 COMPLETE CBC AUTOMATED: CPT

## 2024-08-23 NOTE — H&P
"Patient: Leandra Corona     YOB: 1965 59 y.o. female     Chief Complaints: Ankle pain     History of Present Illness:Patient was seen on 7/10/2024 reporting that she noticed noticed a mass over the superolateral aspect of the mid third of her left lateral lower leg in May 2024.  She had had intermittent discomfort there but has not noted specifically any change in size nor any neuritic symptoms.  She had seen her PCP and had an ultrasound which was equivocal.     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     She was sent for MRI for further evaluation of the mass     Patient was seen on 7/29/2024 with intermittent pain over the anterolateral aspect of the leg on the left in the area of the mass.  She had not had any numbness or tingling.  HPI     ROS: Leg pain  Medical History        Past Medical History:   Diagnosis Date    GERD (gastroesophageal reflux disease)      Lumbosacral disc disease 2001     Herniated, ruptured discs    Osteopenia      Ovarian cyst 2001     One ovary removed    Rh incompatibility 1992    Scoliosis 2001    Spinal headache 1992     Dr tried to give epidural at birth of my first child, but unable to.    Varicella 1974            Physical Exam:   Vitals       Vitals:     07/29/24 1450   Temp: 98 °F (36.7 °C)   Weight: 54.3 kg (119 lb 12.8 oz)   Height: 163.8 cm (64.5\")   PainSc:   1   PainLoc: Leg      Performed 7/29/2024  Well developed with normal mood.  On exam she has a palpable mass over the anterolateral aspect of the left midportion of the lower leg is somewhat tender to palpation this measures about 8 mm x 8 mm and overlying skin is intact it is not mobile.  There is tenderness to palpation with percussion over this area but no Tinel's or elicitation of neuritic symptoms distally.  She did not demonstrate any gross motor weakness.        Radiology: MRI films and report of the left tib-fib dated 7/18/2024 with gel marker show [place] gel " marker and area of enhancing lesion measuring 8 x 8 x 5 mm.  Lesion appears to be fascial base along the lateral margin of the extensor digitorum longus muscle without evidence of deep tissue extension or bone involvement.        Assessment/Plan: Left leg mass     7/29/2024 discussed continued nonoperative versus operative treatment options and although no guarantees could be given mutual decision made to proceed with operative treatment.  We discussed possible ultrasound-guided tissue sampling but this mass is so small is difficult to say if this would yield good results and could still require excision given that it is painful.     I reviewed the operative procedure and postoperative course with her and her  which it was clear understanding as well as associated risk benefits potential outcomes and complications of which there was clear understanding and can include but not limited to heart attack stroke death pneumonia infection bleeding damage to blood vessels nerves or tendons blood clots pulmonary embolism persistent or worsening pain stiffness need for subsequent surgery and failure to return to presurgery and precondition levels of activity including even catastrophic complications that could result in long-term wound care or plastic reconstruction or loss of the leg.     All of their questions were answered to her full satisfaction and plans made to proceed with this on outpatient basis at a mutually convenient time.  She was encouraged to call if she has any questions prior to surgery.     Copied text in this note has been reviewed by me and is accurate as of  08/23/24 .

## 2024-08-23 NOTE — DISCHARGE INSTRUCTIONS
Take the following medications the morning of surgery:  NONE    HOLD YOUR SUPPLEMENTS PRIOR TO SURGERY    ARRIVE TO ENTRANCE C.      If you are on prescription narcotic pain medication to control your pain you may also take that medication the morning of surgery.      General Instructions:     Do not eat solid food after midnight the night before surgery.  Clear liquids day of surgery are allowed but must be stopped at least two hours before your hospital arrival time.       Allowed clear liquids      Water, sodas, and tea or coffee with no cream or milk added.       12 to 20 ounces of a clear liquid that contains carbohydrates is recommended.  If non-diabetic, have Gatorade or Powerade.  If diabetic, have G2 or Powerade Zero.     Do not have liquids red in color.  Do not consume chicken, beef, pork or vegetable broth or bouillon cubes of any variety as they are not considered clear liquids and are not allowed.      Infants may have breast milk up to four hours before surgery.  Infants drinking formula may drink formula up to six hours before surgery.   Patients who avoid smoking, chewing tobacco and alcohol for 4 weeks prior to surgery have a reduced risk of post-operative complications.  Quit smoking as many days before surgery as you can.  Do not smoke, use chewing tobacco or drink alcohol the day of surgery.   If applicable bring your C-PAP/ BI-PAP machine in with you to preop day of surgery.  Bring any papers given to you in the doctor’s office.  Wear clean comfortable clothes.  Do not wear contact lenses, false eyelashes or make-up.  Bring a case for your glasses.   Bring crutches or walker if applicable.  Remove all piercings.  Leave jewelry and any other valuables at home.  Hair extensions with metal clips must be removed prior to surgery.  The Pre-Admission Testing nurse will instruct you to bring medications if unable to obtain an accurate list in Pre-Admission Testing.        If you were given a blood  bank ID arm band remember to bring it with you the day of surgery.    Preventing a Surgical Site Infection:  For 2 to 3 days before surgery, avoid shaving with a razor because the razor can irritate skin and make it easier to develop an infection.    Any areas of open skin can increase the risk of a post-operative wound infection by allowing bacteria to enter and travel throughout the body.  Notify your surgeon if you have any skin wounds / rashes even if it is not near the expected surgical site.  The area will need assessed to determine if surgery should be delayed until it is healed.  The night prior to surgery shower using a fresh bar of anti-bacterial soap (such as Dial) and clean washcloth.  Sleep in a clean bed with clean clothing.  Do not allow pets to sleep with you.  Shower on the morning of surgery using a fresh bar of anti-bacterial soap (such as Dial) and clean washcloth.  Dry with a clean towel and dress in clean clothing.  Ask your surgeon if you will be receiving antibiotics prior to surgery.  Make sure you, your family, and all healthcare providers clean their hands with soap and water or an alcohol based hand  before caring for you or your wound.    Day of surgery:  Your arrival time is approximately two hours before your scheduled surgery time.  Please note if you have an early arrival time the surgery doors do not open before 5:00 AM.  Upon arrival, a Pre-op nurse and Anesthesiologist will review your health history, obtain vital signs, and answer questions you may have.  The only belongings needed at this time will be a list of your home medications and if applicable your C-PAP/BI-PAP machine.  A Pre-op nurse will start an IV and you may receive medication in preparation for surgery, including something to help you relax.     Please be aware that surgery does come with discomfort.  We want to make every effort to control your discomfort so please discuss any uncontrolled symptoms with  your nurse.   Your doctor will most likely have prescribed pain medications.      If you are going home after surgery you will receive individualized written care instructions before being discharged.  A responsible adult must drive you to and from the hospital on the day of your surgery and ideally stay with you through the night.   .  Discharge prescriptions can be filled by the hospital pharmacy during regular pharmacy hours.  If you are having surgery late in the day/evening your prescription may be e-prescribed to your pharmacy.  Please verify your pharmacy hours or chose a 24 hour pharmacy to avoid not having access to your prescription because your pharmacy has closed for the day.    If you are staying overnight following surgery, you will be transported to your hospital room following the recovery period.  [facility] has all private rooms.    If you have any questions please call Pre-Admission Testing at [phone].  Deductibles and co-payments are collected on the day of service. Please be prepared to pay the required co-pay, deductible or deposit on the day of service as defined by your plan.    Call your surgeon immediately if you experience any of the following symptoms:  Sore Throat  Shortness of Breath or difficulty breathing  Cough  Chills  Body soreness or muscle pain  Headache  Fever  New loss of taste or smell  Do not arrive for your surgery ill.  Your procedure will need to be rescheduled to another time.  You will need to call your physician before the day of surgery to avoid any unnecessary exposure to hospital staff as well as other patients.

## 2024-08-27 ENCOUNTER — HOSPITAL ENCOUNTER (OUTPATIENT)
Facility: HOSPITAL | Age: 59
Setting detail: HOSPITAL OUTPATIENT SURGERY
Discharge: HOME | End: 2024-08-27
Attending: ORTHOPAEDIC SURGERY | Admitting: ORTHOPAEDIC SURGERY
Payer: COMMERCIAL

## 2024-08-27 ENCOUNTER — ANESTHESIA EVENT (OUTPATIENT)
Dept: PERIOP | Facility: HOSPITAL | Age: 59
End: 2024-08-27
Payer: COMMERCIAL

## 2024-08-27 ENCOUNTER — ANESTHESIA (OUTPATIENT)
Dept: PERIOP | Facility: HOSPITAL | Age: 59
End: 2024-08-27
Payer: COMMERCIAL

## 2024-08-27 VITALS
TEMPERATURE: 97.4 F | DIASTOLIC BLOOD PRESSURE: 70 MMHG | HEART RATE: 75 BPM | RESPIRATION RATE: 16 BRPM | OXYGEN SATURATION: 100 % | SYSTOLIC BLOOD PRESSURE: 122 MMHG

## 2024-08-27 DIAGNOSIS — R22.42 MASS OF LEG, LEFT: Primary | ICD-10-CM

## 2024-08-27 LAB — GLUCOSE BLDC GLUCOMTR-MCNC: 98 MG/DL (ref 70–130)

## 2024-08-27 PROCEDURE — 25810000003 LACTATED RINGERS PER 1000 ML: Performed by: STUDENT IN AN ORGANIZED HEALTH CARE EDUCATION/TRAINING PROGRAM

## 2024-08-27 PROCEDURE — 25010000002 PROPOFOL 200 MG/20ML EMULSION

## 2024-08-27 PROCEDURE — 25010000002 DEXAMETHASONE SODIUM PHOSPHATE 20 MG/5ML SOLUTION

## 2024-08-27 PROCEDURE — 82948 REAGENT STRIP/BLOOD GLUCOSE: CPT

## 2024-08-27 PROCEDURE — 25010000002 FENTANYL CITRATE (PF) 50 MCG/ML SOLUTION

## 2024-08-27 PROCEDURE — 25010000002 ONDANSETRON PER 1 MG

## 2024-08-27 PROCEDURE — 88342 IMHCHEM/IMCYTCHM 1ST ANTB: CPT | Performed by: ORTHOPAEDIC SURGERY

## 2024-08-27 PROCEDURE — 88307 TISSUE EXAM BY PATHOLOGIST: CPT | Performed by: ORTHOPAEDIC SURGERY

## 2024-08-27 PROCEDURE — 27619 EXC LEG/ANKLE TUM DEEP <5 CM: CPT | Performed by: ORTHOPAEDIC SURGERY

## 2024-08-27 PROCEDURE — 25810000003 SODIUM CHLORIDE 0.9 % SOLUTION 250 ML FLEX CONT: Performed by: ORTHOPAEDIC SURGERY

## 2024-08-27 PROCEDURE — 88341 IMHCHEM/IMCYTCHM EA ADD ANTB: CPT | Performed by: ORTHOPAEDIC SURGERY

## 2024-08-27 PROCEDURE — 25010000002 VANCOMYCIN 750 MG RECONSTITUTED SOLUTION 1 EACH VIAL: Performed by: ORTHOPAEDIC SURGERY

## 2024-08-27 RX ORDER — EPHEDRINE SULFATE 50 MG/ML
5 INJECTION, SOLUTION INTRAVENOUS ONCE AS NEEDED
Status: DISCONTINUED | OUTPATIENT
Start: 2024-08-27 | End: 2024-08-27 | Stop reason: HOSPADM

## 2024-08-27 RX ORDER — ONDANSETRON 2 MG/ML
INJECTION INTRAMUSCULAR; INTRAVENOUS AS NEEDED
Status: DISCONTINUED | OUTPATIENT
Start: 2024-08-27 | End: 2024-08-27 | Stop reason: SURG

## 2024-08-27 RX ORDER — IPRATROPIUM BROMIDE AND ALBUTEROL SULFATE 2.5; .5 MG/3ML; MG/3ML
3 SOLUTION RESPIRATORY (INHALATION) ONCE AS NEEDED
Status: DISCONTINUED | OUTPATIENT
Start: 2024-08-27 | End: 2024-08-27 | Stop reason: HOSPADM

## 2024-08-27 RX ORDER — HYDROMORPHONE HYDROCHLORIDE 1 MG/ML
0.5 INJECTION, SOLUTION INTRAMUSCULAR; INTRAVENOUS; SUBCUTANEOUS
Status: DISCONTINUED | OUTPATIENT
Start: 2024-08-27 | End: 2024-08-27 | Stop reason: HOSPADM

## 2024-08-27 RX ORDER — FENTANYL CITRATE 50 UG/ML
INJECTION, SOLUTION INTRAMUSCULAR; INTRAVENOUS AS NEEDED
Status: DISCONTINUED | OUTPATIENT
Start: 2024-08-27 | End: 2024-08-27 | Stop reason: SURG

## 2024-08-27 RX ORDER — MAGNESIUM HYDROXIDE 1200 MG/15ML
LIQUID ORAL AS NEEDED
Status: DISCONTINUED | OUTPATIENT
Start: 2024-08-27 | End: 2024-08-27 | Stop reason: HOSPADM

## 2024-08-27 RX ORDER — ACETAMINOPHEN 500 MG
1000 TABLET ORAL ONCE
Status: COMPLETED | OUTPATIENT
Start: 2024-08-27 | End: 2024-08-27

## 2024-08-27 RX ORDER — ASPIRIN 325 MG
325 TABLET, DELAYED RELEASE (ENTERIC COATED) ORAL DAILY
Qty: 14 TABLET | Refills: 0 | Status: SHIPPED | OUTPATIENT
Start: 2024-08-28

## 2024-08-27 RX ORDER — LIDOCAINE HYDROCHLORIDE 20 MG/ML
INJECTION, SOLUTION EPIDURAL; INFILTRATION; INTRACAUDAL; PERINEURAL AS NEEDED
Status: DISCONTINUED | OUTPATIENT
Start: 2024-08-27 | End: 2024-08-27 | Stop reason: SURG

## 2024-08-27 RX ORDER — DROPERIDOL 2.5 MG/ML
0.62 INJECTION, SOLUTION INTRAMUSCULAR; INTRAVENOUS
Status: DISCONTINUED | OUTPATIENT
Start: 2024-08-27 | End: 2024-08-27 | Stop reason: HOSPADM

## 2024-08-27 RX ORDER — LABETALOL HYDROCHLORIDE 5 MG/ML
5 INJECTION, SOLUTION INTRAVENOUS
Status: DISCONTINUED | OUTPATIENT
Start: 2024-08-27 | End: 2024-08-27 | Stop reason: HOSPADM

## 2024-08-27 RX ORDER — SODIUM CHLORIDE, SODIUM LACTATE, POTASSIUM CHLORIDE, CALCIUM CHLORIDE 600; 310; 30; 20 MG/100ML; MG/100ML; MG/100ML; MG/100ML
9 INJECTION, SOLUTION INTRAVENOUS CONTINUOUS
Status: DISCONTINUED | OUTPATIENT
Start: 2024-08-27 | End: 2024-08-27 | Stop reason: HOSPADM

## 2024-08-27 RX ORDER — NALOXONE HCL 0.4 MG/ML
0.2 VIAL (ML) INJECTION AS NEEDED
Status: DISCONTINUED | OUTPATIENT
Start: 2024-08-27 | End: 2024-08-27 | Stop reason: HOSPADM

## 2024-08-27 RX ORDER — FENTANYL CITRATE 50 UG/ML
50 INJECTION, SOLUTION INTRAMUSCULAR; INTRAVENOUS
Status: DISCONTINUED | OUTPATIENT
Start: 2024-08-27 | End: 2024-08-27 | Stop reason: HOSPADM

## 2024-08-27 RX ORDER — OXYCODONE AND ACETAMINOPHEN 7.5; 325 MG/1; MG/1
1 TABLET ORAL EVERY 4 HOURS PRN
Status: DISCONTINUED | OUTPATIENT
Start: 2024-08-27 | End: 2024-08-27 | Stop reason: HOSPADM

## 2024-08-27 RX ORDER — PROMETHAZINE HYDROCHLORIDE 25 MG/1
25 TABLET ORAL ONCE AS NEEDED
Status: DISCONTINUED | OUTPATIENT
Start: 2024-08-27 | End: 2024-08-27 | Stop reason: HOSPADM

## 2024-08-27 RX ORDER — DEXAMETHASONE SODIUM PHOSPHATE 4 MG/ML
INJECTION, SOLUTION INTRA-ARTICULAR; INTRALESIONAL; INTRAMUSCULAR; INTRAVENOUS; SOFT TISSUE AS NEEDED
Status: DISCONTINUED | OUTPATIENT
Start: 2024-08-27 | End: 2024-08-27 | Stop reason: SURG

## 2024-08-27 RX ORDER — DEXTROSE MONOHYDRATE 25 G/50ML
12.5 INJECTION, SOLUTION INTRAVENOUS AS NEEDED
Status: DISCONTINUED | OUTPATIENT
Start: 2024-08-27 | End: 2024-08-27 | Stop reason: HOSPADM

## 2024-08-27 RX ORDER — HYDROCODONE BITARTRATE AND ACETAMINOPHEN 5; 325 MG/1; MG/1
1 TABLET ORAL EVERY 6 HOURS PRN
Qty: 16 TABLET | Refills: 0 | Status: SHIPPED | OUTPATIENT
Start: 2024-08-27 | End: 2024-09-09

## 2024-08-27 RX ORDER — SODIUM CHLORIDE 0.9 % (FLUSH) 0.9 %
3-10 SYRINGE (ML) INJECTION AS NEEDED
Status: DISCONTINUED | OUTPATIENT
Start: 2024-08-27 | End: 2024-08-27 | Stop reason: HOSPADM

## 2024-08-27 RX ORDER — HYDROCODONE BITARTRATE AND ACETAMINOPHEN 7.5; 325 MG/1; MG/1
1 TABLET ORAL ONCE AS NEEDED
Status: DISCONTINUED | OUTPATIENT
Start: 2024-08-27 | End: 2024-08-27 | Stop reason: HOSPADM

## 2024-08-27 RX ORDER — ONDANSETRON 2 MG/ML
4 INJECTION INTRAMUSCULAR; INTRAVENOUS ONCE AS NEEDED
Status: DISCONTINUED | OUTPATIENT
Start: 2024-08-27 | End: 2024-08-27 | Stop reason: HOSPADM

## 2024-08-27 RX ORDER — MIDAZOLAM HYDROCHLORIDE 1 MG/ML
1 INJECTION INTRAMUSCULAR; INTRAVENOUS
Status: DISCONTINUED | OUTPATIENT
Start: 2024-08-27 | End: 2024-08-27 | Stop reason: HOSPADM

## 2024-08-27 RX ORDER — HYDRALAZINE HYDROCHLORIDE 20 MG/ML
5 INJECTION INTRAMUSCULAR; INTRAVENOUS
Status: DISCONTINUED | OUTPATIENT
Start: 2024-08-27 | End: 2024-08-27 | Stop reason: HOSPADM

## 2024-08-27 RX ORDER — LIDOCAINE HYDROCHLORIDE 10 MG/ML
0.5 INJECTION, SOLUTION INFILTRATION; PERINEURAL ONCE AS NEEDED
Status: DISCONTINUED | OUTPATIENT
Start: 2024-08-27 | End: 2024-08-27 | Stop reason: HOSPADM

## 2024-08-27 RX ORDER — CLINDAMYCIN HCL 150 MG
150 CAPSULE ORAL 3 TIMES DAILY
Qty: 6 CAPSULE | Refills: 0 | Status: SHIPPED | OUTPATIENT
Start: 2024-08-27 | End: 2024-08-29

## 2024-08-27 RX ORDER — PROMETHAZINE HYDROCHLORIDE 25 MG/1
25 SUPPOSITORY RECTAL ONCE AS NEEDED
Status: DISCONTINUED | OUTPATIENT
Start: 2024-08-27 | End: 2024-08-27 | Stop reason: HOSPADM

## 2024-08-27 RX ORDER — PROPOFOL 10 MG/ML
INJECTION, EMULSION INTRAVENOUS AS NEEDED
Status: DISCONTINUED | OUTPATIENT
Start: 2024-08-27 | End: 2024-08-27 | Stop reason: SURG

## 2024-08-27 RX ORDER — BUPIVACAINE HYDROCHLORIDE AND EPINEPHRINE 5; 5 MG/ML; UG/ML
INJECTION, SOLUTION EPIDURAL; INTRACAUDAL; PERINEURAL AS NEEDED
Status: DISCONTINUED | OUTPATIENT
Start: 2024-08-27 | End: 2024-08-27 | Stop reason: HOSPADM

## 2024-08-27 RX ORDER — DIPHENHYDRAMINE HYDROCHLORIDE 50 MG/ML
12.5 INJECTION INTRAMUSCULAR; INTRAVENOUS
Status: DISCONTINUED | OUTPATIENT
Start: 2024-08-27 | End: 2024-08-27 | Stop reason: HOSPADM

## 2024-08-27 RX ORDER — SODIUM CHLORIDE 0.9 % (FLUSH) 0.9 %
3 SYRINGE (ML) INJECTION EVERY 12 HOURS SCHEDULED
Status: DISCONTINUED | OUTPATIENT
Start: 2024-08-27 | End: 2024-08-27 | Stop reason: HOSPADM

## 2024-08-27 RX ORDER — FLUMAZENIL 0.1 MG/ML
0.2 INJECTION INTRAVENOUS AS NEEDED
Status: DISCONTINUED | OUTPATIENT
Start: 2024-08-27 | End: 2024-08-27 | Stop reason: HOSPADM

## 2024-08-27 RX ADMIN — VANCOMYCIN HYDROCHLORIDE 750 MG: 750 INJECTION, POWDER, LYOPHILIZED, FOR SOLUTION INTRAVENOUS at 08:01

## 2024-08-27 RX ADMIN — PROPOFOL 200 MG: 10 INJECTION, EMULSION INTRAVENOUS at 08:11

## 2024-08-27 RX ADMIN — ACETAMINOPHEN 1000 MG: 500 TABLET ORAL at 07:24

## 2024-08-27 RX ADMIN — LIDOCAINE HYDROCHLORIDE 60 MG: 20 INJECTION, SOLUTION EPIDURAL; INFILTRATION; INTRACAUDAL; PERINEURAL at 08:11

## 2024-08-27 RX ADMIN — FENTANYL CITRATE 50 MCG: 50 INJECTION, SOLUTION INTRAMUSCULAR; INTRAVENOUS at 08:11

## 2024-08-27 RX ADMIN — SODIUM CHLORIDE, POTASSIUM CHLORIDE, SODIUM LACTATE AND CALCIUM CHLORIDE 9 ML/HR: 600; 310; 30; 20 INJECTION, SOLUTION INTRAVENOUS at 07:22

## 2024-08-27 RX ADMIN — ONDANSETRON 4 MG: 2 INJECTION INTRAMUSCULAR; INTRAVENOUS at 08:45

## 2024-08-27 RX ADMIN — DEXAMETHASONE SODIUM PHOSPHATE 8 MG: 4 INJECTION, SOLUTION INTRAMUSCULAR; INTRAVENOUS at 08:19

## 2024-08-27 NOTE — ANESTHESIA PROCEDURE NOTES
Airway  Urgency: elective    Date/Time: 8/27/2024 8:13 AM    General Information and Staff    Patient location during procedure: OR  Anesthesiologist: Fantasma Peralta MD  CRNA/CAA: Ghislaine Cartwright CRNA    Indications and Patient Condition  Indications for airway management: airway protection    Preoxygenated: yes  MILS not maintained throughout  Mask difficulty assessment: 1 - vent by mask    Final Airway Details  Final airway type: supraglottic airway      Successful airway: unique  Size 4     Number of attempts at approach: 1  Assessment: lips, teeth, and gum same as pre-op and atraumatic intubation

## 2024-08-27 NOTE — BRIEF OP NOTE
EXCISION MASS LOWER EXTREMITY  Progress Note    Leandra Corona  8/27/2024    Pre-op Diagnosis:   Mass of leg, left [R22.42]       Post-Op Diagnosis Codes:     * Mass of leg, left [R22.42]    Procedure/CPT® Codes:        Procedure(s):  EXCISION MASS LEFT LOWER EXTREMITY              Surgeon(s):  Shorty Pearson MD    Anesthesia: General    Staff:   Circulator: Jacinta Lamar RN; Carlitos Rivero RN  Scrub Person: Gonzalo Collins         Estimated Blood Loss: minimal    Urine Voided: * No values recorded between 8/27/2024  8:07 AM and 8/27/2024  8:44 AM *    Specimens:                Specimens       ID Source Type Tests Collected By Collected At Frozen?    A Leg, Left Tissue TISSUE PATHOLOGY EXAM   Shorty Pearson MD 8/27/24 0842     Description: left leg mass    This specimen was not marked as sent.                  Drains: * No LDAs found *    Findings: see dict        Complications: none    TT 13 min          Shorty Pearson MD     Date: 8/27/2024  Time: 09:04 EDT

## 2024-08-27 NOTE — OP NOTE
Physical Therapy Daily Progress Note Entered On:  5/19/2017 14:40     Performed On:  5/19/2017 8:32  by Carmella Ndiaye   PT Exercise Each 15 Min :   1    PT Gait Each 15 Min :   1    Carmella Ndiaye 5/19/2017 14:35    Musculoskeletal Assessment   Comments and Special Tests :   R TKA exercises morning supine x 10 reps A/AAROM  R TKA exercises seated afternoon x 10 reps A/AAROM   Carmella Ndiaye 5/19/2017 14:35    Wheelchair Mobility   Supine to Sitting :   Supervision   Sitting to Standing :   Supervision   Standing to Sitting :   Supervision   (Comment: vc for safety [Carmella Ndiaye 5/19/2017 14:35 ] )   Carmella Ndiaye 5/19/2017 14:35    Gait Grid   Assistive Device :    Rolling walker - standard   Rolling walker - standard           Distance :    300 ft   300 ft           Level of Assistance :    SBA   SBA           Location :    Other: hallway   Other: hallway           Comments  (Comment: am session [Carmella Ndiaye 5/19/2017 14:35 ] )  (Comment: pm session [Carmella Ndiaye 5/19/2017 14:35 ] )        Carmella Ndiaye 5/19/2017 14:35   Carmella Ndiaye 5/19/2017 14:35         Gait Analysis :   Patient intially with increased trunk flexion with R stance phase, uneven agapito, improved with cueing and in afternoon session   Carmella Ndiaye 5/19/2017 14:35    PT Stairs Grid   Number of Stairs :    8               Railing Use :    Left side              Pattern :    Step-to-step              Level of Assistance :    SBA              Comments  (Comment: BID [Carmella Ndiaye - 5/19/2017 14:35 ] )         Carmella Ndiaye 5/19/2017 14:35          Endurance/Activity Level :   Good   CVA Patient :   No   Carmella Ndiaye 5/19/2017 14:35    Balance   KU Balance Scale Performed - Sitting :   Yes   KU Balance Scale Performed - Standing :   Yes   [facility] Sitting Balance Scale :   4 = Independently moves and returns trunkal midpoint 1-2 inches in 1 plane   [facility] Standing Balance Scale :   2+ = Independently  Operative Note      Facility[facility]  Patient Name: Leandra Corona  YOB: 1965  Date: 8/27/2024  Medical Record Number: [id number]      Pre-op Diagnosis: Left leg mass      Post-op Diagnosis:  Left leg mass         Procedure(s):  EXCISION MASS LEFT LOWER EXTREMITY    Surgeon(s):  Shorty Pearson MD    Anesthesia: General  Anesthesiologist: Fantasma Peralta MD  CRNA: Ghislaine Cartwright CRNA    Staff:   Circulator: Jacinta Lamar RN; Carlitos Rivero RN  Scrub Person: Gonzalo Collins  Assistants : none      Tourniquet time: 13 minutes        Estimated Blood Loss:  minimal  Drains: None  Specimens:   Order Name Source Comment Collection Info Order Time   TISSUE PATHOLOGY EXAM Leg, Left  Collected By: Shorty Pearson MD 8/27/2024  8:42 AM     Release to patient   Routine Release          Findings: See Dictation    Complications: None      Indications for Procedure: Patient has had a history of a painful mass over the anterolateral aspect of the upper third of her left lower leg since May of this year.  MRI was inconclusive for etiology.  Although no guarantees could given decision made to proceed with excisional biopsy with clear understanding of risk benefits potential outcomes and complications.          Description of Procedure: Preoperative informed consent and anesthesia evaluation were obtained.  IV antibiotics were administered and the surgical site was marked.  Patient was brought to the operating placed in supine position.  Anesthesia was induced and LMA was positioned.  Well-padded tourniquet was placed left proximal thigh.  Left leg was then prepped and draped in a sterile fashion and surgical timeout is performed.    I was able to palpate and delineate the area of mass as noted preoperatively.  A 3 cm incision was created centrally over this and full-thickness flaps were carefully elevated down through fatty subcutaneous tissue.  This was then retracted and I  supports self with one upper extremity   Carmella Ndiaye Ashley 5/19/2017 14:35    Treatments and Interventions   Treatments and Interventions Checklist :   Therapeutic exercise, Therapeutic transfer training, Bed mobility, Functional activities, Gait training, Stairs/stoop training   Patient/Family Education :   Yes   Carmella Ndiaye Ashley 5/19/2017 14:35    Education   General Patient Education [employer]   Topics :    Plan of care              Individuals Taught :    Patient              Barriers to Learning :    None evident              Patient Family Preference :    Explanation              Teaching Evaluation :    Verbalizes understanding                Carmella Ndiaye - 5/19/2017 14:35          Preferred Communication Mode :   Verbal   Language for Written Material :   English   Carmella Ndiaye Ashley 5/19/2017 14:35    Assessment and Goals   Assessment :   Patient seen BID - p.m. session billed separately.  Patient received from PORTER Suarez in a.m. supine in bed, in p.m. seated in bedside chair.  Patient performed supine R TKA exercises in a.m. and seated exercises in p.m.  Patient performed bed mobility, transfers, gait and stair negotiation training.  Patient with improving ability for functional mobility with improved gait.  Patient continues to require assistance with mobilization with exercise for R LE.  Patient will benefit from subacute rehab and will continue BID x 6, daily x 1 although patient scheduled to d/c to subacute this early evening.    Total time a.m. 28 minutes, ex 15, 13 gait  p.m. session 15 minutes gait     Rehab Potential :   Good   Follow-Up PT Recommendations :   Subacute rehab   Carmella Ndiaye Ashley 5/19/2017 14:35    PT Goals Grid   Problem :    bed mobility   transfers   gait   stairs     Patient will... :    transition sup<>sit at Mod I to get into/out of bed   demo sit<>stand and tf bed<>chair with RW and SBA for OOB activities   amb at least 150ft with RW and SBA to promote household amb   negotiate up/down 8  was able to identify a nodular mass that was adherent to the overlying extensor fascia with a rent in the fascia.  There did appear to be a small vessel coursing into this that was carefully anticoagulated.  The mass was then fully exposed proximally distally medially laterally and deeply as it extended down through the fascia and was adherent to the underlying muscle.  This was then fully excised and passed off as specimen.  This measures approximately 7 mm x 5 mm x 4 mm.  The area was then inspected and there was no further evidence of any further mass.  The wound was then thoroughly irrigated with dilute Betadine solution.  The fascial rent measured approximately 13 mm.  Fascia was then closed with 3-0 Vicryl suture.  Tourniquet was released and hemostasis was obtained.  Wound was infiltrated circumferentially and deeply with a total of 20 cc half percent Marcaine with epinephrine.  Skin was then closed with 4-0 Vicryl and 3-0 nylon.  Sterile dressing was applied and patient was placed into a well-padded soft tissue dressing from the toes to the upper calf.  Patient was awakened transferred to [facility] and taken recovery in stable condition     steps with R rail and SBA in prep for return home     Timeframe :    1-2 days   1-2 days   1-2 days   1-2 days     Comments   (Comment: met upgrade to mod I [Carmella Ndiaye 5/19/2017 14:35 ] )  (Comment: met upgrade to mod I [Carmella Ndiaye - 5/19/2017 14:35 ] )  (Comment: met upgrade to mod I [Carmella Ndiaye - 5/19/2017 14:35 ] )      Carmella Ndiaye - 5/19/2017 14:35   Carmella Ndiaye - 5/19/2017 14:35   Carmella Ndiaye - 5/19/2017 14:35   Carmella Ndiaye - 5/19/2017 14:35       Problem :    stoop              Patient will... :    negotiate up/down 1 stoop with RW and SBA in prep for return home              Timeframe :    1-2 days                Carmella Ndiaye 5/19/2017 14:35          PT Plan/Recommendations :   Therapeutic exercise, Bed mobility, Transfer training, Gait training, Education of patient/family   Frequency :   BID x 6 days per week, daily x 1 day per week   Location of PT Intervention :   Bedside, Department   Home Exercise Program :   Issued   PT Care Plan Discussed With :   Patient   Carmella Ndiaye 5/19/2017 14:35

## 2024-08-27 NOTE — ANESTHESIA POSTPROCEDURE EVALUATION
Patient: Leandra Corona    Procedure Summary       Date: 08/27/24 Room / Location:  [facility]    Anesthesia Start: 0807 Anesthesia Stop: 0919    Procedure: EXCISION MASS LEFT LOWER EXTREMITY (Left) Diagnosis:       Mass of leg, left      (Mass of leg, left [R22.42])    Surgeons: Shorty Pearson MD Provider: Fantasma Peralta MD    Anesthesia Type: general ASA Status: 2            Anesthesia Type: general    Vitals  Vitals Value Taken Time   /76 08/27/24 0945   Temp 36.3 °C (97.4 °F) 08/27/24 0916   Pulse 89 08/27/24 0951   Resp 18 08/27/24 0945   SpO2 100 % 08/27/24 0950   Vitals shown include unfiled device data.        Anesthesia Post Evaluation

## 2024-08-27 NOTE — ANESTHESIA PREPROCEDURE EVALUATION
Anesthesia Evaluation     Patient summary reviewed and Nursing notes reviewed   no history of anesthetic complications:   NPO Solid Status: > 8 hours  NPO Liquid Status: > 2 hours           Airway   Mallampati: II  TM distance: >3 FB  Neck ROM: full  Dental      Pulmonary    Cardiovascular         Neuro/Psych  GI/Hepatic/Renal/Endo    (+) GERD    Musculoskeletal     Abdominal    Substance History      OB/GYN          Other                    Anesthesia Plan    ASA 2     general     intravenous induction     Anesthetic plan, risks, benefits, and alternatives have been provided, discussed and informed consent has been obtained with: patient.    CODE STATUS:

## 2024-08-27 NOTE — ADDENDUM NOTE
Addendum  created 08/27/24 1014 by Fantasma Peralta MD    Attestation recorded in Intraprocedure, Intraprocedure Attestations filed

## 2024-09-01 LAB
CYTO UR: NORMAL
LAB AP CASE REPORT: NORMAL
LAB AP DIAGNOSIS COMMENT: NORMAL
LAB AP SPECIAL STAINS: NORMAL
PATH REPORT.FINAL DX SPEC: NORMAL
PATH REPORT.GROSS SPEC: NORMAL

## 2024-09-03 ENCOUNTER — TELEPHONE (OUTPATIENT)
Dept: ORTHOPEDIC SURGERY | Facility: CLINIC | Age: 59
End: 2024-09-03
Payer: COMMERCIAL

## 2024-09-03 NOTE — TELEPHONE ENCOUNTER
I spoke with patient and gave her the results of the pathology report and from review of literature this is a benign lesion and does not require anything further than the excision that we did.  She may do some partial weightbearing with her crutches this week.  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  She appreciated the call

## 2024-09-09 ENCOUNTER — OFFICE VISIT (OUTPATIENT)
Dept: ORTHOPEDIC SURGERY | Facility: CLINIC | Age: 59
End: 2024-09-09
Payer: COMMERCIAL

## 2024-09-09 VITALS — WEIGHT: 120 LBS | HEIGHT: 65 IN | BODY MASS INDEX: 19.99 KG/M2 | TEMPERATURE: 97.1 F

## 2024-09-09 DIAGNOSIS — D21.9 ANGIOLEIOMYOMA: Primary | ICD-10-CM

## 2024-09-09 PROCEDURE — 99024 POSTOP FOLLOW-UP VISIT: CPT | Performed by: ORTHOPAEDIC SURGERY

## 2024-09-09 NOTE — PROGRESS NOTES
"legFollow Up      Patient: Leandra Corona    YOB: 1965 59 y.o. female    Chief Complaints: Leg getting better    History of Present Illness: Patient underwent excisional biopsy of the left leg mass from the superolateral aspect of the middle third of her left lateral lower leg that been present since May 2024.  This was performed on 8/27/2024.  Pathology showed angioleiomyoma and echo indicated these results to her on 9/3/2024 at which time she was improving and was allowed partial weightbearing.  Reviewed her that this was a benign lesion and did not feel she needed further workup or treatment.    Patient is seen back that he has been doing partial weightbearing with crutches.  She reports her pain is improving with little bit of aching pain and feeling of pulling over her wound.  HPI    ROS: Leg ankle pain  Past Medical History:   Diagnosis Date    GERD (gastroesophageal reflux disease)     Lumbosacral disc disease 2001    Herniated, ruptured discs    Osteopenia     Ovarian cyst 2001    One ovary removed    Rh incompatibility 1992    Scoliosis 2001    Spinal headache 1992    Dr tried to give epidural at birth of my first child, but unable to.    Varicella 1974       Physical Exam:   Vitals:    09/09/24 1539   Temp: 97.1 °F (36.2 °C)   Weight: 54.4 kg (120 lb)   Height: 165.1 cm (65\")   PainSc:   3     Well developed with normal mood.  She is with her .  Her left leg incision is healing well there is no sign of infection she is able dorsiflex and plantarflex her ankle well and was grossly sensate light touch distal to the incision.      Radiology:    MRI films and report of the left tib-fib dated 7/18/2024 with gel marker show [place] gel marker and area of enhancing lesion measuring 8 x 8 x 5 mm.  Lesion appears to be fascial base along the lateral margin of the extensor digitorum longus muscle without evidence of deep tissue extension or bone involvement.        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  Sutures removed and Steri-Strips were applied.  She was instructed on wound care milligrams and wean off of her crutches and resume normal shoewear.  She will stick activities of daily living for the next several weeks and then gradually Burns activities as tolerated.  If she has recurrent persistent symptoms she will follow-up otherwise by mutual agreement I will see her back as needed

## 2024-09-27 RX ORDER — FAMOTIDINE 40 MG/1
40 TABLET, FILM COATED ORAL DAILY
Qty: 90 TABLET | Refills: 1 | Status: SHIPPED | OUTPATIENT
Start: 2024-09-27

## 2025-01-02 DIAGNOSIS — Z00.00 WELL ADULT EXAM: ICD-10-CM

## 2025-01-07 LAB
ALBUMIN SERPL-MCNC: 4.4 G/DL (ref 3.8–4.9)
ALP SERPL-CCNC: 84 IU/L (ref 44–121)
ALT SERPL-CCNC: 15 IU/L (ref 0–32)
APPEARANCE UR: CLEAR
AST SERPL-CCNC: 20 IU/L (ref 0–40)
BACTERIA #/AREA URNS HPF: NORMAL /[HPF]
BACTERIA UR CULT: NORMAL
BACTERIA UR CULT: NORMAL
BASOPHILS # BLD AUTO: 0.1 X10E3/UL (ref 0–0.2)
BASOPHILS NFR BLD AUTO: 1 %
BILIRUB SERPL-MCNC: 0.4 MG/DL (ref 0–1.2)
BILIRUB UR QL STRIP: NEGATIVE
BUN SERPL-MCNC: 12 MG/DL (ref 8–27)
BUN/CREAT SERPL: 15 (ref 12–28)
CALCIUM SERPL-MCNC: 9.2 MG/DL (ref 8.7–10.3)
CASTS URNS QL MICRO: NORMAL /LPF
CHLORIDE SERPL-SCNC: 102 MMOL/L (ref 96–106)
CHOLEST SERPL-MCNC: 193 MG/DL (ref 100–199)
CO2 SERPL-SCNC: 25 MMOL/L (ref 20–29)
COLOR UR: YELLOW
CREAT SERPL-MCNC: 0.8 MG/DL (ref 0.57–1)
EGFRCR SERPLBLD CKD-EPI 2021: 84 ML/MIN/1.73
EOSINOPHIL # BLD AUTO: 0.1 X10E3/UL (ref 0–0.4)
EOSINOPHIL NFR BLD AUTO: 1 %
EPI CELLS #/AREA URNS HPF: NORMAL /HPF (ref 0–10)
ERYTHROCYTE [DISTWIDTH] IN BLOOD BY AUTOMATED COUNT: 12 % (ref 11.7–15.4)
GLOBULIN SER CALC-MCNC: 2.1 G/DL (ref 1.5–4.5)
GLUCOSE SERPL-MCNC: 72 MG/DL (ref 70–99)
GLUCOSE UR QL STRIP: NEGATIVE
HCT VFR BLD AUTO: 41.3 % (ref 34–46.6)
HDLC SERPL-MCNC: 75 MG/DL
HGB BLD-MCNC: 13.6 G/DL (ref 11.1–15.9)
HGB UR QL STRIP: NEGATIVE
IMM GRANULOCYTES # BLD AUTO: 0 X10E3/UL (ref 0–0.1)
IMM GRANULOCYTES NFR BLD AUTO: 0 %
KETONES UR QL STRIP: NEGATIVE
LDLC SERPL CALC-MCNC: 100 MG/DL (ref 0–99)
LDLC/HDLC SERPL: 1.3 RATIO (ref 0–3.2)
LEUKOCYTE ESTERASE UR QL STRIP: ABNORMAL
LYMPHOCYTES # BLD AUTO: 2.1 X10E3/UL (ref 0.7–3.1)
LYMPHOCYTES NFR BLD AUTO: 38 %
MCH RBC QN AUTO: 30.6 PG (ref 26.6–33)
MCHC RBC AUTO-ENTMCNC: 32.9 G/DL (ref 31.5–35.7)
MCV RBC AUTO: 93 FL (ref 79–97)
MICRO URNS: ABNORMAL
MONOCYTES # BLD AUTO: 0.6 X10E3/UL (ref 0.1–0.9)
MONOCYTES NFR BLD AUTO: 11 %
NEUTROPHILS # BLD AUTO: 2.6 X10E3/UL (ref 1.4–7)
NEUTROPHILS NFR BLD AUTO: 49 %
NITRITE UR QL STRIP: NEGATIVE
PH UR STRIP: 6.5 [PH] (ref 5–7.5)
PLATELET # BLD AUTO: 235 X10E3/UL (ref 150–450)
POTASSIUM SERPL-SCNC: 3.8 MMOL/L (ref 3.5–5.2)
PROT SERPL-MCNC: 6.5 G/DL (ref 6–8.5)
PROT UR QL STRIP: NEGATIVE
RBC # BLD AUTO: 4.44 X10E6/UL (ref 3.77–5.28)
RBC #/AREA URNS HPF: NORMAL /HPF (ref 0–2)
SODIUM SERPL-SCNC: 140 MMOL/L (ref 134–144)
SP GR UR STRIP: 1.01 (ref 1–1.03)
T4 FREE SERPL-MCNC: 1.18 NG/DL (ref 0.82–1.77)
TRIGL SERPL-MCNC: 103 MG/DL (ref 0–149)
TSH SERPL DL<=0.005 MIU/L-ACNC: 3.09 UIU/ML (ref 0.45–4.5)
URINALYSIS REFLEX: ABNORMAL
UROBILINOGEN UR STRIP-MCNC: 0.2 MG/DL (ref 0.2–1)
VLDLC SERPL CALC-MCNC: 18 MG/DL (ref 5–40)
WBC # BLD AUTO: 5.4 X10E3/UL (ref 3.4–10.8)
WBC #/AREA URNS HPF: NORMAL /HPF (ref 0–5)

## 2025-01-10 ENCOUNTER — OFFICE VISIT (OUTPATIENT)
Dept: FAMILY MEDICINE CLINIC | Facility: CLINIC | Age: 60
End: 2025-01-10
Payer: COMMERCIAL

## 2025-01-10 VITALS
DIASTOLIC BLOOD PRESSURE: 74 MMHG | WEIGHT: 121 LBS | OXYGEN SATURATION: 98 % | HEART RATE: 79 BPM | SYSTOLIC BLOOD PRESSURE: 126 MMHG | BODY MASS INDEX: 20.16 KG/M2 | HEIGHT: 65 IN

## 2025-01-10 DIAGNOSIS — Z00.00 WELL ADULT EXAM: ICD-10-CM

## 2025-01-10 DIAGNOSIS — I49.3 VENTRICULAR ECTOPY: Primary | ICD-10-CM

## 2025-01-10 DIAGNOSIS — K21.9 GASTROESOPHAGEAL REFLUX DISEASE, UNSPECIFIED WHETHER ESOPHAGITIS PRESENT: ICD-10-CM

## 2025-01-10 DIAGNOSIS — M85.80 OSTEOPENIA AFTER MENOPAUSE: ICD-10-CM

## 2025-01-10 DIAGNOSIS — G47.9 SLEEP TROUBLE: ICD-10-CM

## 2025-01-10 DIAGNOSIS — Z78.0 OSTEOPENIA AFTER MENOPAUSE: ICD-10-CM

## 2025-01-10 RX ORDER — COLLAGEN, HYDROLYSATE (BOVINE) 100 %
1 POWDER (GRAM) MISCELLANEOUS DAILY
Qty: 1 G | Refills: 3 | Status: SHIPPED | OUTPATIENT
Start: 2025-01-10

## 2025-01-10 RX ORDER — ZINC OXIDE 13 %
1 CREAM (GRAM) TOPICAL DAILY
Qty: 90 CAPSULE | Refills: 2 | Status: SHIPPED | OUTPATIENT
Start: 2025-01-10

## 2025-01-10 NOTE — PROGRESS NOTES
Procedure     ECG 12 Lead    Date/Time: 1/10/2025 1:46 PM  Performed by: Elmira Frey MD    Authorized by: Elmira Frey MD  Comparison: not compared with previous ECG   Rhythm: sinus rhythm  Rate: normal  Conduction: conduction normal  ST Segments: ST segments normal  T Waves: T waves normal  QRS axis: normal    Clinical impression: normal ECG

## 2025-01-10 NOTE — PROGRESS NOTES
Chief Complaint  Annual Exam    Patient or patient representative verbalized consent for the use of Ambient Listening during the visit with  Elmira Frey MD for chart documentation. 1/10/2025  12:00 EST    Kamar Corona presents to [facility]    History of Present Illness  The patient is a 59-year-old female who presents for her physical exam.    She reports experiencing heartburn, particularly when lying down at night. She has been managing this with famotidine 40 mg, taken before bedtime, which she finds beneficial. She adheres to a dietary regimen that excludes food intake after 6 PM and avoids carbonated beverages and alcohol in the evening. Her diet is monitored, with a particular focus on limiting red sauces and chocolate consumption, although she admits to a fondness for the latter. She frequently uses Rolaids for symptom relief. Her last endoscopy was performed in 05/2019 by Dr. Mike, with normal findings in the esophagus, stomach, and duodenum. She has no history of esophagitis or Somers's esophagus. She consumes a small amount of caffeine in the form of a hot chocolate and instant coffee mix in the morning and afternoon. She has expressed interest in trying Voquezna, a new medication she saw advertised, and has some leftover Dexilant at home. She experiences daily reflux symptoms, which she hopes will be alleviated by taking famotidine at night. She has not been diagnosed with H. pylori infection and reports no dysphagia. She has a history of cholecystectomy and hysterectomy. She was previously on Dexilant for approximately 10 years but discontinued its use due to concerns about osteoporosis and dementia side effects    She underwent a bone density test approximately 1 to 2 years ago, which revealed osteopenia. She maintains a diet rich in calcium, including milk and cheese, and engages in regular walking exercises. She also takes vitamin D  supplements daily.    She occasionally experiences pain in RUQ area, for which she takes Tylenol. She did not require ibuprofen during her leg surgery and generally avoids it due to potential gastrointestinal side effects.    She has been taking probiotics but has run out and is seeking a prescription for them. She also takes collagen supplements and is requesting a prescription for these as well and would like to get them filled using money from [facility]    She is requesting a sleep aid as she often wakes up at 3 AM and struggles to return to sleep, especially during school days. She takes melatonin 3 mg at bedtime, which helps her sleep until around 3 AM. She sometimes wakes up due to heartburn but also wakes up spontaneously. She typically gets out of bed, repositions herself, and attempts to return to sleep. Her usual wake-up time on school days is around 5:15 AM. She occasionally uses the bathroom upon waking but does not always do so. She does not check her phone during these awakenings. She reports regular bowel movements and does not take magnesium supplements. She has discontinued the use of Unisom due to concerns about potential dementia risk.    SOCIAL HISTORY  She does not drink alcohol.    MEDICATIONS  Current: Famotidine, Rolaids, melatonin, Tylenol, vitamin D, collagen supplements, probiotics.  Past: Dexilant, Unisom.  MAMMO SCREENING DIGITAL TOMOSYNTHESIS BILATERAL W CAD (07/10/2024 10:15)   Urinalysis With Culture If Indicated - (01/03/2025 08:40)  T4, Free (01/03/2025 08:40)  TSH (01/03/2025 08:40)  Lipid Panel With LDL / HDL Ratio (01/03/2025 08:40)  Comprehensive Metabolic Panel (01/03/2025 08:40)  CBC & Differential (01/03/2025 08:40)  Microscopic Examination - (01/03/2025 08:40)  Urine culture, Comprehensive - , (01/03/2025 08:40)  DEXA Bone Density Axial (01/20/2023 13:10)   Objective   Vital Signs:  /74 (BP Location: Left arm, Patient Position: Sitting, Cuff Size: Adult)   Pulse 79   Ht  "165.1 cm (65\")   Wt 54.9 kg (121 lb)   SpO2 98%   BMI 20.14 kg/m²   Estimated body mass index is 20.14 kg/m² as calculated from the following:    Height as of this encounter: 165.1 cm (65\").    Weight as of this encounter: 54.9 kg (121 lb).    BMI is within normal parameters. No other follow-up for BMI required.      Physical Exam  Vitals and nursing note reviewed.   Constitutional:       Appearance: Normal appearance. She is well-developed.   HENT:      Head: Normocephalic and atraumatic.      Right Ear: Tympanic membrane, ear canal and external ear normal.      Left Ear: Tympanic membrane, ear canal and external ear normal.      Mouth/Throat:      Mouth: Mucous membranes are moist.   Eyes:      Extraocular Movements: Extraocular movements intact.      Conjunctiva/sclera: Conjunctivae normal.   Neck:      Vascular: No carotid bruit.   Cardiovascular:      Rate and Rhythm: Normal rate and regular rhythm.      Heart sounds: Normal heart sounds.      Comments: No bruits  +ectopic beats while sitting upright  Pulmonary:      Effort: Pulmonary effort is normal. No respiratory distress.      Breath sounds: Normal breath sounds. No stridor. No wheezing, rhonchi or rales.   Chest:      Chest wall: No tenderness.   Abdominal:      General: Bowel sounds are normal. There is no distension.      Palpations: Abdomen is soft. There is no mass.      Tenderness: There is no abdominal tenderness. There is no guarding or rebound.      Hernia: No hernia is present.   Musculoskeletal:      Cervical back: Neck supple.      Right lower leg: No edema.      Left lower leg: No edema.   Lymphadenopathy:      Cervical: No cervical adenopathy.   Skin:     General: Skin is warm.   Neurological:      General: No focal deficit present.      Mental Status: She is alert and oriented to person, place, and time. Mental status is at baseline.      Gait: Gait normal.   Psychiatric:         Mood and Affect: Mood normal.         Behavior: Behavior " normal.         Thought Content: Thought content normal.         Judgment: Judgment normal.        Result Review :                   Assessment and Plan   Diagnoses and all orders for this visit:    1. Ventricular ectopy (Primary)  -     ECG 12 Lead    2. Well adult exam    3. Gastroesophageal reflux disease, unspecified whether esophagitis present    4. Sleep trouble    5. Osteopenia after menopause    Other orders  -     Probiotic Product (Probiotic Daily) capsule; Take 1 tablet by mouth Daily.  Dispense: 90 capsule; Refill: 2  -     Collagen Hydrolysate powder; Use 1 Scoop Daily.  Dispense: 1 g; Refill: 3  -     Discontinue: Magnesium Citrate 200 MG tablet; Take 1 tablet by mouth every night at bedtime.  Dispense: 90 tablet; Refill: 3  -     Magnesium Citrate 200 MG tablet; Take 1 tablet by mouth every night at bedtime.  Dispense: 90 tablet; Refill: 3             Assessment & Plan  1. Gastroesophageal reflux disease (GERD).  Her GERD symptoms are not severe, given her healthy weight and dietary habits. She is not anemic, which is a positive sign. Her recent lab results, including hemoglobin, hematocrit, glucose, kidney function, liver function, electrolytes, cholesterol, and thyroid levels, are all within normal ranges. She was advised she could increase her famotidine dosage to 40 mg twice daily. Alternatively, she could consider a 3-month course of Dexilant, followed by a return to Pepcid as a maintenance medication. She was also advised to continue taking probiotics, with a recommendation to cycle on and off every 3 months. A prescription for probiotics was provided. She was encouraged to maintain adequate hydration and to urinate when necessary, rather than holding it in. If her reflux symptoms persist despite the Dexilant treatment, a consultation with Dr. Mike for an EGD will be recommended    2. Osteopenia.  She was diagnosed with osteopenia in January 2023. She was advised to continue her daily vitamin  D intake and ensure adequate calcium in her diet through foods like milk and cheese. Weight-bearing exercises such as walking were recommended.    3. Sleep disturbances.  She was advised to start a magnesium supplement at night to aid with sleep and bone health. She was informed that magnesium could cause diarrhea, in which case she should discontinue its use. She was also advised to consider cognitive behavioral therapy for sleep issues.  We could consider elavil if Mg++ is ineffective.  She will let me know after a few months of trying the Mg++qhs.  Elavil was discussed.  It might help with GI sx    4. Medication management.  She requested prescriptions for probiotics and collagen to be filled at a compound pharmacy. A prescription for probiotics was provided. She was advised to continue taking probiotics, with a recommendation to cycle on and off every 3 months. A prescription for collagen was also provided.    5. Health maintenance.  Her mammogram conducted in the summer of 2024 at [place] was negative. She is due for her next colonoscopy in 2029.    PROCEDURE  Endoscopy performed in 05/2019 by  showed normal findings in the esophagus, stomach, and duodenum.  CN normal.         Follow Up   No follow-ups on file.  Patient was given instructions and counseling regarding her condition or for health maintenance advice. Please see specific information pulled into the AVS if appropriate.           Elmira Frey MD   13:51 EST   [unfilled]

## 2025-02-17 RX ORDER — FAMOTIDINE 40 MG/1
40 TABLET, FILM COATED ORAL DAILY
Qty: 90 TABLET | Refills: 0 | Status: SHIPPED | OUTPATIENT
Start: 2025-02-17

## 2025-05-06 RX ORDER — FAMOTIDINE 40 MG/1
40 TABLET, FILM COATED ORAL DAILY
Qty: 90 TABLET | Refills: 1 | Status: SHIPPED | OUTPATIENT
Start: 2025-05-06

## 2025-05-06 NOTE — TELEPHONE ENCOUNTER
Rx Refill Note  Requested Prescriptions     Pending Prescriptions Disp Refills    famotidine (PEPCID) 40 MG tablet 90 tablet 0     Sig: Take 1 tablet by mouth Daily.      Last office visit with prescribing clinician: 1/10/2025   Last telemedicine visit with prescribing clinician: Visit date not found   Next office visit with prescribing clinician: Visit date not found                         Would you like a call back once the refill request has been completed: [] Yes [] No    If the office needs to give you a call back, can they leave a voicemail: [] Yes [] No    Brittney Laws MA  05/06/25, 10:48 EDT

## (undated) DEVICE — PREP SOL POVIDONE/IODINE BT 4OZ

## (undated) DEVICE — PENCL SMOKE/EVAC MEGADYNE TELESCP 10FT

## (undated) DEVICE — DRESSING,GAUZE,XEROFORM,CURAD,1"X8",ST: Brand: CURAD

## (undated) DEVICE — SKIN PREP TRAY W/CHG: Brand: MEDLINE INDUSTRIES, INC.

## (undated) DEVICE — GLV SURG SIGNATURE ESSENTIAL PF LTX SZ8

## (undated) DEVICE — APPL CHLORAPREP HI/LITE 26ML ORNG

## (undated) DEVICE — ELECTRD NDL EZ CLN MOD 2.75IN

## (undated) DEVICE — DRAPE,U/ SHT,SPLIT,PLAS,STERIL: Brand: MEDLINE

## (undated) DEVICE — GOWN,NON-REINFORCED,SIRUS,SET IN SLV,XXL: Brand: MEDLINE

## (undated) DEVICE — PATIENT RETURN ELECTRODE, SINGLE-USE, CONTACT QUALITY MONITORING, ADULT, WITH 9FT CORD, FOR PATIENTS WEIGING OVER 33LBS. (15KG): Brand: MEGADYNE

## (undated) DEVICE — GLV SURG BIOGEL LTX PF 8

## (undated) DEVICE — PK ORTHO MINOR TOWER 40

## (undated) DEVICE — BNDG,ELSTC,MATRIX,STRL,4"X5YD,LF,HOOK&LP: Brand: MEDLINE

## (undated) DEVICE — UNDERCAST PADDING: Brand: DEROYAL